# Patient Record
Sex: FEMALE | Race: WHITE | HISPANIC OR LATINO | Employment: UNEMPLOYED | URBAN - METROPOLITAN AREA
[De-identification: names, ages, dates, MRNs, and addresses within clinical notes are randomized per-mention and may not be internally consistent; named-entity substitution may affect disease eponyms.]

---

## 2021-10-21 PROBLEM — Z00.00 ANNUAL PHYSICAL EXAM: Status: ACTIVE | Noted: 2021-10-21

## 2021-10-25 ENCOUNTER — OFFICE VISIT (OUTPATIENT)
Dept: FAMILY MEDICINE CLINIC | Facility: CLINIC | Age: 31
End: 2021-10-25

## 2021-10-25 VITALS
HEART RATE: 88 BPM | BODY MASS INDEX: 29.37 KG/M2 | SYSTOLIC BLOOD PRESSURE: 100 MMHG | DIASTOLIC BLOOD PRESSURE: 70 MMHG | WEIGHT: 172 LBS | HEIGHT: 64 IN | TEMPERATURE: 98.7 F | OXYGEN SATURATION: 98 % | RESPIRATION RATE: 16 BRPM

## 2021-10-25 DIAGNOSIS — Z23 IMMUNIZATION DUE: ICD-10-CM

## 2021-10-25 DIAGNOSIS — Z11.59 NEED FOR HEPATITIS C SCREENING TEST: ICD-10-CM

## 2021-10-25 DIAGNOSIS — T48.5X5A RHINITIS MEDICAMENTOSA: ICD-10-CM

## 2021-10-25 DIAGNOSIS — J31.0 CHRONIC RHINITIS: ICD-10-CM

## 2021-10-25 DIAGNOSIS — Z00.00 ANNUAL PHYSICAL EXAM: Primary | ICD-10-CM

## 2021-10-25 DIAGNOSIS — J31.0 RHINITIS MEDICAMENTOSA: ICD-10-CM

## 2021-10-25 PROBLEM — L40.9 PSORIASIS: Status: ACTIVE | Noted: 2021-10-25

## 2021-10-25 PROCEDURE — 99385 PREV VISIT NEW AGE 18-39: CPT | Performed by: FAMILY MEDICINE

## 2021-10-25 PROCEDURE — 90686 IIV4 VACC NO PRSV 0.5 ML IM: CPT | Performed by: FAMILY MEDICINE

## 2021-10-25 PROCEDURE — 90471 IMMUNIZATION ADMIN: CPT | Performed by: FAMILY MEDICINE

## 2021-10-25 RX ORDER — PREDNISONE 10 MG/1
TABLET ORAL
Qty: 40 TABLET | Refills: 0 | Status: SHIPPED | OUTPATIENT
Start: 2021-10-25 | End: 2021-12-29

## 2021-10-25 RX ORDER — METHYLPREDNISOLONE 4 MG/1
TABLET ORAL
Qty: 21 EACH | Refills: 0 | Status: SHIPPED | OUTPATIENT
Start: 2021-10-25 | End: 2021-10-25 | Stop reason: CLARIF

## 2021-10-26 ENCOUNTER — APPOINTMENT (OUTPATIENT)
Dept: LAB | Facility: CLINIC | Age: 31
End: 2021-10-26

## 2021-10-26 DIAGNOSIS — Z11.59 NEED FOR HEPATITIS C SCREENING TEST: ICD-10-CM

## 2021-10-26 DIAGNOSIS — Z00.00 ANNUAL PHYSICAL EXAM: ICD-10-CM

## 2021-10-26 LAB
ALBUMIN SERPL BCP-MCNC: 3.9 G/DL (ref 3.5–5)
ALP SERPL-CCNC: 81 U/L (ref 46–116)
ALT SERPL W P-5'-P-CCNC: 83 U/L (ref 12–78)
ANION GAP SERPL CALCULATED.3IONS-SCNC: 4 MMOL/L (ref 4–13)
AST SERPL W P-5'-P-CCNC: 23 U/L (ref 5–45)
BASOPHILS # BLD AUTO: 0.05 THOUSANDS/ΜL (ref 0–0.1)
BASOPHILS NFR BLD AUTO: 0 % (ref 0–1)
BILIRUB SERPL-MCNC: 0.42 MG/DL (ref 0.2–1)
BUN SERPL-MCNC: 11 MG/DL (ref 5–25)
CALCIUM SERPL-MCNC: 9.5 MG/DL (ref 8.3–10.1)
CHLORIDE SERPL-SCNC: 107 MMOL/L (ref 100–108)
CHOLEST SERPL-MCNC: 269 MG/DL (ref 50–200)
CO2 SERPL-SCNC: 24 MMOL/L (ref 21–32)
CREAT SERPL-MCNC: 0.67 MG/DL (ref 0.6–1.3)
EOSINOPHIL # BLD AUTO: 0.15 THOUSAND/ΜL (ref 0–0.61)
EOSINOPHIL NFR BLD AUTO: 1 % (ref 0–6)
ERYTHROCYTE [DISTWIDTH] IN BLOOD BY AUTOMATED COUNT: 12.4 % (ref 11.6–15.1)
GFR SERPL CREATININE-BSD FRML MDRD: 119 ML/MIN/1.73SQ M
GLUCOSE P FAST SERPL-MCNC: 92 MG/DL (ref 65–99)
HCT VFR BLD AUTO: 39.3 % (ref 34.8–46.1)
HCV AB SER QL: NORMAL
HDLC SERPL-MCNC: 43 MG/DL
HGB BLD-MCNC: 12.6 G/DL (ref 11.5–15.4)
IMM GRANULOCYTES # BLD AUTO: 0.05 THOUSAND/UL (ref 0–0.2)
IMM GRANULOCYTES NFR BLD AUTO: 0 % (ref 0–2)
LDLC SERPL CALC-MCNC: 198 MG/DL (ref 0–100)
LYMPHOCYTES # BLD AUTO: 3.86 THOUSANDS/ΜL (ref 0.6–4.47)
LYMPHOCYTES NFR BLD AUTO: 31 % (ref 14–44)
MCH RBC QN AUTO: 29.4 PG (ref 26.8–34.3)
MCHC RBC AUTO-ENTMCNC: 32.1 G/DL (ref 31.4–37.4)
MCV RBC AUTO: 92 FL (ref 82–98)
MONOCYTES # BLD AUTO: 0.83 THOUSAND/ΜL (ref 0.17–1.22)
MONOCYTES NFR BLD AUTO: 7 % (ref 4–12)
NEUTROPHILS # BLD AUTO: 7.48 THOUSANDS/ΜL (ref 1.85–7.62)
NEUTS SEG NFR BLD AUTO: 61 % (ref 43–75)
NONHDLC SERPL-MCNC: 226 MG/DL
NRBC BLD AUTO-RTO: 0 /100 WBCS
PLATELET # BLD AUTO: 349 THOUSANDS/UL (ref 149–390)
PMV BLD AUTO: 11 FL (ref 8.9–12.7)
POTASSIUM SERPL-SCNC: 3.7 MMOL/L (ref 3.5–5.3)
PROT SERPL-MCNC: 8 G/DL (ref 6.4–8.2)
RBC # BLD AUTO: 4.29 MILLION/UL (ref 3.81–5.12)
SODIUM SERPL-SCNC: 135 MMOL/L (ref 136–145)
TRIGL SERPL-MCNC: 138 MG/DL
WBC # BLD AUTO: 12.42 THOUSAND/UL (ref 4.31–10.16)

## 2021-10-26 PROCEDURE — 85025 COMPLETE CBC W/AUTO DIFF WBC: CPT

## 2021-10-26 PROCEDURE — 86803 HEPATITIS C AB TEST: CPT

## 2021-10-26 PROCEDURE — 36415 COLL VENOUS BLD VENIPUNCTURE: CPT | Performed by: FAMILY MEDICINE

## 2021-10-26 PROCEDURE — 80053 COMPREHEN METABOLIC PANEL: CPT | Performed by: FAMILY MEDICINE

## 2021-10-26 PROCEDURE — 80061 LIPID PANEL: CPT | Performed by: FAMILY MEDICINE

## 2021-11-02 DIAGNOSIS — R79.9 ABNORMAL BLOOD CHEMISTRY: Primary | ICD-10-CM

## 2021-12-28 ENCOUNTER — OFFICE VISIT (OUTPATIENT)
Dept: FAMILY MEDICINE CLINIC | Facility: CLINIC | Age: 31
End: 2021-12-28

## 2021-12-28 VITALS
TEMPERATURE: 97.4 F | RESPIRATION RATE: 18 BRPM | HEART RATE: 96 BPM | HEIGHT: 64 IN | BODY MASS INDEX: 28.68 KG/M2 | DIASTOLIC BLOOD PRESSURE: 68 MMHG | SYSTOLIC BLOOD PRESSURE: 100 MMHG | OXYGEN SATURATION: 98 % | WEIGHT: 168 LBS

## 2021-12-28 DIAGNOSIS — Z32.01 POSITIVE PREGNANCY TEST: ICD-10-CM

## 2021-12-28 DIAGNOSIS — N91.2 AMENORRHEA: Primary | ICD-10-CM

## 2021-12-28 LAB — SL AMB POCT URINE HCG: POSITIVE

## 2021-12-28 PROCEDURE — 81025 URINE PREGNANCY TEST: CPT | Performed by: FAMILY MEDICINE

## 2021-12-28 PROCEDURE — 99214 OFFICE O/P EST MOD 30 MIN: CPT | Performed by: FAMILY MEDICINE

## 2022-01-06 ENCOUNTER — ULTRASOUND (OUTPATIENT)
Dept: OBGYN CLINIC | Facility: CLINIC | Age: 32
End: 2022-01-06

## 2022-01-06 VITALS
DIASTOLIC BLOOD PRESSURE: 70 MMHG | HEIGHT: 64 IN | WEIGHT: 167 LBS | HEART RATE: 106 BPM | SYSTOLIC BLOOD PRESSURE: 108 MMHG | BODY MASS INDEX: 28.51 KG/M2

## 2022-01-06 DIAGNOSIS — Z3A.17 17 WEEKS GESTATION OF PREGNANCY: Primary | Chronic | ICD-10-CM

## 2022-01-06 PROCEDURE — 99213 OFFICE O/P EST LOW 20 MIN: CPT | Performed by: OBSTETRICS & GYNECOLOGY

## 2022-01-06 NOTE — PROGRESS NOTES
Assessment  36 y o   at 17w3d presenting for amenorrhea  Pregnancy confirmed, dating inconsistent with LMP  DEMETRIUS 2022  Plan  Problem List Items Addressed This Visit        Other    17 weeks gestation of pregnancy - Primary (Chronic)        - Prenatal vitamin  - Prenatal panel (slips given today)  - Discussed genetic screening  - Prenatal Nursing Intake in 1 weeks  - Prenatal H&P in 2 weeks     ____________________________________________________________      Subjective   Kerri Perez is a 36 y o  Lisaalee Vargas with an LMP of Patient's last menstrual period was 2020 (lmp unknown)  (Not calculated  by LMP) who presents for amenorrhea  She notes she took a home pregnancy test on 12/15/2021 and it was positive  She is currently otherwise without complaint  Patient notes that this pregnancy was unplanned  She was not using contraception at the time  She reports she is uncertain of her LMP and that she has regular menses  She has has no vaginal bleeding since her LMP  Patient's prior pregnancies were uncomplicated term vaginal delivery        Objective  /70 (BP Location: Left arm, Patient Position: Sitting, Cuff Size: Adult)   Pulse (!) 106   Ht 5' 4" (1 626 m)   Wt 75 8 kg (167 lb)   LMP 2020 (LMP Unknown)   BMI 28 67 kg/m²       Physical Exam:  Physical Exam        Transvaginal Pelvic Ultrasound  Spears IUP  BPD: 37 4 mm 17w3d  HC: 137 5 mm 17w1d   AC: 118 mm  17w3d  FL: 25 5 mm 17w 3d   Placental fundal anterior  MAGED, LVP: 4 28   bpm  Viable intrauterine pregnancy, US compatible with 17w3d, LMP unknown    No adnexal masses appreciated    Luann Redd MD  OBGYN, PGY-4  2022  10:34 AM

## 2022-01-11 ENCOUNTER — INITIAL PRENATAL (OUTPATIENT)
Dept: OBGYN CLINIC | Facility: CLINIC | Age: 32
End: 2022-01-11

## 2022-01-11 ENCOUNTER — LAB (OUTPATIENT)
Dept: LAB | Facility: CLINIC | Age: 32
End: 2022-01-11

## 2022-01-11 VITALS — RESPIRATION RATE: 16 BRPM | HEIGHT: 64 IN | BODY MASS INDEX: 28.27 KG/M2 | WEIGHT: 165.6 LBS

## 2022-01-11 DIAGNOSIS — O09.32 LATE PRENATAL CARE AFFECTING PREGNANCY IN SECOND TRIMESTER: Primary | ICD-10-CM

## 2022-01-11 DIAGNOSIS — R79.9 ABNORMAL BLOOD CHEMISTRY: ICD-10-CM

## 2022-01-11 LAB
ALT SERPL W P-5'-P-CCNC: 55 U/L (ref 12–78)
BASOPHILS # BLD AUTO: 0.03 THOUSANDS/ΜL (ref 0–0.1)
BASOPHILS NFR BLD AUTO: 0 % (ref 0–1)
CHOLEST SERPL-MCNC: 247 MG/DL
EOSINOPHIL # BLD AUTO: 0.1 THOUSAND/ΜL (ref 0–0.61)
EOSINOPHIL NFR BLD AUTO: 1 % (ref 0–6)
ERYTHROCYTE [DISTWIDTH] IN BLOOD BY AUTOMATED COUNT: 12.1 % (ref 11.6–15.1)
HCT VFR BLD AUTO: 39.2 % (ref 34.8–46.1)
HDLC SERPL-MCNC: 46 MG/DL
HGB BLD-MCNC: 12.3 G/DL (ref 11.5–15.4)
IMM GRANULOCYTES # BLD AUTO: 0.03 THOUSAND/UL (ref 0–0.2)
IMM GRANULOCYTES NFR BLD AUTO: 0 % (ref 0–2)
LDLC SERPL CALC-MCNC: 157 MG/DL (ref 0–100)
LYMPHOCYTES # BLD AUTO: 2.31 THOUSANDS/ΜL (ref 0.6–4.47)
LYMPHOCYTES NFR BLD AUTO: 27 % (ref 14–44)
MCH RBC QN AUTO: 28.3 PG (ref 26.8–34.3)
MCHC RBC AUTO-ENTMCNC: 31.4 G/DL (ref 31.4–37.4)
MCV RBC AUTO: 90 FL (ref 82–98)
MONOCYTES # BLD AUTO: 0.63 THOUSAND/ΜL (ref 0.17–1.22)
MONOCYTES NFR BLD AUTO: 7 % (ref 4–12)
NEUTROPHILS # BLD AUTO: 5.39 THOUSANDS/ΜL (ref 1.85–7.62)
NEUTS SEG NFR BLD AUTO: 65 % (ref 43–75)
NONHDLC SERPL-MCNC: 201 MG/DL
NRBC BLD AUTO-RTO: 0 /100 WBCS
PLATELET # BLD AUTO: 310 THOUSANDS/UL (ref 149–390)
PMV BLD AUTO: 11.7 FL (ref 8.9–12.7)
RBC # BLD AUTO: 4.34 MILLION/UL (ref 3.81–5.12)
TRIGL SERPL-MCNC: 219 MG/DL
WBC # BLD AUTO: 8.49 THOUSAND/UL (ref 4.31–10.16)

## 2022-01-11 PROCEDURE — 84460 ALANINE AMINO (ALT) (SGPT): CPT

## 2022-01-11 PROCEDURE — 36415 COLL VENOUS BLD VENIPUNCTURE: CPT

## 2022-01-11 PROCEDURE — 85025 COMPLETE CBC W/AUTO DIFF WBC: CPT

## 2022-01-11 PROCEDURE — 80061 LIPID PANEL: CPT

## 2022-01-11 NOTE — LETTER
Proof of Pregnancy Letter    Franky Monet  1990  [address]  [address]  [address]        01/11/22      Jamilah Bonilla is a patient at our facility  Franky Monet Estimated Date of Delivery: 6/13/22       Any questions or concerns, please feel free to contact our office      Sincerely,     [facility]   [address],   [place], [address] [place] [address]   [phone]

## 2022-01-11 NOTE — PROGRESS NOTES
OB INTAKE INTERVIEW  Pt presents for OB intake    OB History    Para Term  AB Living   2 1 1     1   SAB IAB Ectopic Multiple Live Births           1      # Outcome Date GA Lbr Craig/2nd Weight Sex Delivery Anes PTL Lv   2 Current            1 Term              Hx of  delivery prior to 36 weeks 6 days:  No   If yes, place a referral for cervical surveillance at 16 weeks  Last Menstrual Period:    Patient's last menstrual period was 2020 (lmp unknown)  Ultrasound date: 2022  17 weeks 0 days     Estimated Date of Delivery: 22   Confirmed by  US    H&P visit scheduled  2022      Last pap smear: unknown date    Findings; lab pap smear results: patient does not know    Current Issues:  Constipation :   No  Headaches :   Yes  Cramping:  No  Spotting :   No  PICA cravings :  No    FOB Involved:   Yes  Planned pregnancy:  No    I have these concerns about this prenatal patient: intake done with  language line  Pregnancy is unplanned  Patient did not know until 16 weeks due to irregular periods  States she was on a lot of diet pills (does not know name) prior to discovering she was pregnant  C/O headaches, diarrhea, loss of appetite and nausea since last week  I explained that this may not be related to pregnancy  Discussed with attending  Patient is going to try to complete home covid test-- will call with results  She is fully vaccinated  Patient will also call if headaches worsen and are unrelieved by tylenol  Level 2 US scheduled for 22 at [address]  [facility] Pregnancy Essentials Book reviewed and discussed    Baby and Me Handout   Baby and [address] Handout   [facility] Handout   Discussed genetic testing   Prenatal lab work: Scripts printed and given to pt   Influenza vaccine given today: No   Discussed Tdap vaccine     Immunizations:   Immunization History   Administered Date(s) Administered  COVID-19 PFIZER VACCINE 0 3 ML IM 07/19/2021, 08/22/2021    Influenza, injectable, quadrivalent, preservative free 0 5 mL 10/25/2021     Depression Screening Follow-up Plan: Patient's depression screening was negative with an [place] score of  8  Clinically patient does not have depression  No treatment is required             Infection Screening: Does the pt have a hx of MRSA? No  If yes- please follow MRSA protocol and obtain a nasal swab for MRSA culture    The patient was oriented to our practice and all questions were answered    Interviewed by: Jignesh Kennedy RN 01/11/22

## 2022-01-11 NOTE — LETTER
Work Letter    Beck Velazco Jhonny Tillmana  1990  [place]  [address] [id number]    Dear Rohan Jackson,      01/11/22        Your employee is a patient at [facility]  We recommend that all pregnant women:    1  Have a well-ventilated workspace  2  Wear low-heeled shoes  3  Work no more than 40 hours per week  4  Have a 15 minute break every 2 hours and at least 30 minutes for a meal break  5  Use good body mechanics by bending at your knees to avoid back strain and lift no more than 20 pounds without assistance  Will need assistance with lifting over 20 lbs  6  Have ready access to bathrooms and water  She may continue to work until her due date unless medical complications arise  We anticipate she may return to work in 6-8 weeks after delivery       Sincerely,    [facility]

## 2022-01-11 NOTE — LETTER
Dentist Letter    Stephen Schultz  1990  [facility]  [address]  [address] [facility] [address]          01/11/22    We have had several requests from local dentist requesting permission to perform procedures on our patients who are pregnant  We wish to respond with this letter regarding some of the more routine procedures that we have been asked about  The following procedures may be performed on our obstetric patients:   1  Administration of local anesthesia   2  Administration of antibiotics such as PCN, Ampicillin, and Erythromycin  3  Administration of pain medications such as Tylenol, Tylenol with codeine, and if needed Percocet  4  Shielded X-rays    Should you have any questions, please do not hesitate to contact at [phone]          Sincerely,    [facility]  [phone]

## 2022-01-11 NOTE — LETTER
[facility] Letter    Giulia Ovalle  1990  [address]  [address] [place]       01/11/22          Dani Walker is a patient and under our care in our office  Orlando Esparza's Estimated Date of Delivery: 6/13/22  Any questions or concerns feel free to contact our office       Thank you,    [address] [facility],[address]  [id number] [place] [address]/Jill Sam 15  [address] [facility]/Shae Walters [address] [place] [address]/[address]  [phone]

## 2022-01-12 ENCOUNTER — PATIENT OUTREACH (OUTPATIENT)
Dept: OBGYN CLINIC | Facility: CLINIC | Age: 32
End: 2022-01-12

## 2022-01-18 ENCOUNTER — PATIENT OUTREACH (OUTPATIENT)
Dept: OBGYN CLINIC | Facility: CLINIC | Age: 32
End: 2022-01-18

## 2022-01-18 NOTE — PROGRESS NOTES
JENNIFER HOLT spoke with 35 y/o-S- G2:P1-  Upper sorbian speaking woman for pre sumanth assessment  Pt resides with RUBEN and their 3 y/o son  Pt reported pregnancy was not intended but welcome  Pt states she did not seek pre  care sooner because she always has had irregular menses  Pt is a  and RUBEN works full time  Pt does expressed some financial concerns  Pt denies any usage of drug, alcohol or smoking  Denies any Mental Health or Domestic Violence History  Pt uses [facility] to keep her appointments  Pt claimed RUBEN and his sister are supportive  Pt's extended family remains in [facility]  Pt recently relocated from [place] due to RUBEN's work  Pt is trying to know her community  JENNIFER HOLT provided information regarding local [employer] and [employer]  Pt competed 3 1/2 years of college  Pt main concern is the hospital bill  Pt already met with the financial counselor to apply for the assistance and was encouraged to call Teetee Dickson for follow up  Pt has MA for her son and WIC  Need to call [address] to make an appointment for her  Pt denies other concern at this time  JENNIFER HOLT explained her role and gave contact information  Pt was encouraged to call at any time needed

## 2022-01-19 ENCOUNTER — HOSPITAL ENCOUNTER (OUTPATIENT)
Facility: HOSPITAL | Age: 32
Discharge: HOME | End: 2022-01-19
Attending: OBSTETRICS & GYNECOLOGY | Admitting: OBSTETRICS & GYNECOLOGY

## 2022-01-19 ENCOUNTER — HOSPITAL ENCOUNTER (OUTPATIENT)
Facility: HOSPITAL | Age: 32
End: 2022-01-19
Attending: OBSTETRICS & GYNECOLOGY | Admitting: OBSTETRICS & GYNECOLOGY

## 2022-01-19 VITALS
DIASTOLIC BLOOD PRESSURE: 64 MMHG | SYSTOLIC BLOOD PRESSURE: 105 MMHG | TEMPERATURE: 98.6 F | RESPIRATION RATE: 16 BRPM | HEART RATE: 92 BPM

## 2022-01-19 PROBLEM — Z3A.19 19 WEEKS GESTATION OF PREGNANCY: Status: ACTIVE | Noted: 2022-01-06

## 2022-01-19 PROBLEM — R51.9 HEADACHE: Status: ACTIVE | Noted: 2022-01-19

## 2022-01-19 PROCEDURE — 99214 OFFICE O/P EST MOD 30 MIN: CPT

## 2022-01-19 PROCEDURE — NC001 PR NO CHARGE: Performed by: OBSTETRICS & GYNECOLOGY

## 2022-01-19 RX ORDER — ACETAMINOPHEN 325 MG/1
650 TABLET ORAL EVERY 6 HOURS PRN
Status: DISCONTINUED | OUTPATIENT
Start: 2022-01-19 | End: 2022-01-19 | Stop reason: HOSPADM

## 2022-01-19 RX ORDER — METOCLOPRAMIDE HYDROCHLORIDE 5 MG/ML
10 INJECTION INTRAMUSCULAR; INTRAVENOUS EVERY 8 HOURS SCHEDULED
Status: DISCONTINUED | OUTPATIENT
Start: 2022-01-19 | End: 2022-01-19 | Stop reason: HOSPADM

## 2022-01-19 RX ORDER — MAGNESIUM SULFATE HEPTAHYDRATE 40 MG/ML
2 INJECTION, SOLUTION INTRAVENOUS
Status: DISCONTINUED | OUTPATIENT
Start: 2022-01-19 | End: 2022-01-19 | Stop reason: HOSPADM

## 2022-01-19 RX ADMIN — METOCLOPRAMIDE 10 MG: 5 INJECTION, SOLUTION INTRAMUSCULAR; INTRAVENOUS at 13:21

## 2022-01-19 RX ADMIN — SODIUM CHLORIDE 1000 ML: 0.9 INJECTION, SOLUTION INTRAVENOUS at 13:21

## 2022-01-19 RX ADMIN — MAGNESIUM SULFATE HEPTAHYDRATE 2 G: 40 INJECTION, SOLUTION INTRAVENOUS at 13:25

## 2022-01-19 RX ADMIN — SODIUM CHLORIDE 500 MG: 0.9 INJECTION, SOLUTION INTRAVENOUS at 14:21

## 2022-01-19 NOTE — PROGRESS NOTES
L&D Triage Note - OB/GYN  Emily Paredes 36 y o  female MRN: [id number]  Unit/Bed#:  TRIAGE  Encounter: [id number]      ASSESSMENT:    Emily Paredes is a 36 y o  Keira Packer at 19w2d who is presenting with a headache that has been gradually worsening over the last 2 days  This headache is more severe than her typical headaches, but otherwise she is denying red flag symptoms  Additionally she has no OB complaints and vital signs are WNL  PLAN:    1) Migraine Treatment   - Insert IV   - NS bolus IVF   - Reglan 10mg IV   - Tylenol PO   - Magnesium 2g IV   - Solumedrol 500mg IV     2)  Reassessment following Migraine [place]: Patient is significantly improved  Strict return precautions given for red flag symptoms of headache in pregnancy  Doses of Tylenol reviewed  Patient instructed to avoid other anti-headache medications  Migraine prophylaxis lifestyle modifications discussed  3) Continue routine prenatal care  4) Discharge from [facility] triage with  labor precautions    - Reviewed rupture of membranes, false vs true labor, decreased fetal movement, and vaginal bleeding   - Pt to call provider with any concerns and follow up at her next scheduled prenatal appointment   - Case discussed with Dr Constantino Otero:    Emily Paredes 36 y o  Keira Packer at 19w2d with an Estimated Date of Delivery: 22  I collected this history with the assistance of a phone  since patient is Bangladeshi speaking only  She is presenting this afternoon with a headache that has been gradually worsening over the past 2 days  She states that the headache is located in the bifrontal region without radiation  She is denying any trauma, fevers, photophobia, neck stiffness, neurologic symptoms including changes in vision, numbness, tingling, or weakness  She states that the headahce came on gradually and was not maximal at onset    Additionally, she says that this headache is similar to in quality to her typical headaches that she usually experiences but is more severe than her typical headaches and is different also because it was not alleviated by 4 doses of tylenol that she took yesterday  She has not tried anything else to alleviate the headache yet  Obstetrical history and complaints: She is denying any abdominal pain, contractions, vaginal bleeding, loss of fluid or cramping  She states that her past pregnancy was a normal vaginal birth that occurred at term without complication  This pregnancy has no known complications to date, however patient does note that she was late to prenatal care because she did not know she was pregnant until a few weeks ago  Up until that time, the patient had not been taking prenatal vitamins and was taking a "weight loss pill" that she can't remember the name of  She has since begun taking her prenatal vitamins, stopped taking the unknown medication, and has been seen for outpatient care at [facility] in [facility]           Contractions: denies  Leakage of fluid: denies  Vaginal Bleeding: denies  Fetal movement: states that she only just began to occasionally feel movement this week    OBJECTIVE:    Vitals:    01/19/22 1225   BP: 105/64   Pulse: 92   Resp: 16   Temp: 98 6 °F (37 °C)       ROS:  Constitutional: Negative  Respiratory: Negative  Cardiovascular: Negative    Gastrointestinal: Negative    General Physical Exam:  General: well developed and well nourished, non-toxic, alert, oriented times 3 and normal vitals  Cardiovascular: Cor RRR and No murmurs  Lungs: non-labored breathing, CTABL, No Wheezes, Rales or Rhonchi  Abdomen: abdomen is soft without significant tenderness, masses, organomegaly or guarding   Lower extremeties: nontender  Neuro: Sensation intact, no weakness in all muscle groups, CN II-XII intact, no facial droop, no dysarthria, normal FTN, normal HTS, Romberg negative, normal ambulation    Fetal monitoring:  FHT:  150 bpm via doppler      Felisa Palmer MD  Emergency Medicine- PGY1  1/19/2022 12:57 PM

## 2022-01-19 NOTE — DISCHARGE INSTRUCTIONS
El embarazo de la semana 19 a la 22   LO QUE NECESITA SABER:   Ahora que usted está en martínez mamie trimestre, tiene [place]  Es posible que también sienta más [place] de lo normal  Es posible que usted esté aumentando de ½ a 1 brady por [place], y que martínez embarazo se empiece a notar  Posiblemente usted necesite comenzar a usar ropa de maternidad  Conforme martínez bebé está creciendo, usted podría presentar otros síntomas  Estos podrían incluir dolor corporal o estrías en martínez abdomen, senos, muslos y glúteos  INSTRUCCIONES SOBRE EL [facility]AZIA HOSPITALARIA:   Busque atención médica de inmediato si:  · Usted presenta un guadalupe dolor de jorge que no desaparece  · Usted tiene cambios en la visión nuevos o en aumento, mayelin visión borrosa o con manchas  · Usted tiene inflamación nueva o creciente en martínez vita o michelle  · Usted tiene manchado o sangrado vaginal     · Usted rompe sai o siente un chorro o gotas de agua tibia que le está bajando por martínez vagina  Llame a martínez médico u obstetra si:  · Usted tiene calambres, presión o tensión abdominal     · Usted tiene un cambio en la secreción vaginal     · Usted no puede retener alimentos ni líquidos y está perdiendo [place]  · Usted tiene escalofríos o fiebre  · Usted tiene comezón, ardor o dolor vaginal     · Usted tiene cirstian secreción vaginal amarillenta, verdosa, janine o de [employer]  · Usted tiene dolor o ardor al Ellyn Pong, orina menos de lo habitual o tiene [place] rosada o sanguinolenta  · Usted tiene preguntas o inquietudes acerca de martínez condición o cuidado  Cómo cuidarse en esta etapa de martínez embarazo:      · Consuma alimentos saludables y variados  Alimentos saludables incluyen frutas, verduras, panes de shey integral, alimentos lácteos bajos en grasa, frijoles, connie magras y pescado  [place] líquidos mayelin se le haya indicado  Pregunte cuánto líquido debe tiffanie cada día y cuáles líquidos son los más adecuados para usted   Limite el consumo de cafeína a menos de 200 miligramos cada día  Limite el consumo de pescado a 2 porciones cada semana  Escoja pescado con concentraciones bajas de nancy mayelin atún al natural enlatado, camarón, salmón, bacalao o tilapia  No coma pescado con concentraciones altas de nancy mayelin pez chasidy, caballa gigante, pargo rayado y tiburón  · [id number] [address] [place]  Martínez necesidad de ciertas vitaminas y [facility], mayelin el ácido fólico, aumenta whitley el [facility]  Las vitaminas prenatales proporcionan algunas de las vitaminas y minerales adicionales que usted necesita  Las vitaminas prenatales también podrían ayudar a disminuir el riesgo de ciertos defectos de nacimiento  · Consulte con martínez médico acerca de hacer ejercicio  El ejercicio moderado puede ayudarla a mantenerse en forma  Martínez médico la ayudará a planear un programa de ejercicios que sea seguro para usted whitley martínez [facility]  · No fume  Fumar aumenta el riesgo de aborto espontáneo y otros problemas de su whitley martínez [facility]  Fumar puede causar que martínez bebé nazca antes de tiempo o que pese menos al nacer  Solicite información a martínez médico si usted necesita ayuda para dejar de fumar  · No consuma alcohol  El alcohol pasa de martínez cuerpo al bebé a través de la placenta  Puede afectar el desarrollo del cerebro de martínez bebé y provocar el síndrome de alcoholismo fetal (SAF)  El SAF es un lauren de condiciones que causan problemas [place], de comportamiento y de crecimiento  · Consulte con martínez médico antes de tiffanie cualquier medicamento  Muchos medicamentos pueden perjudicar a martínez bebé si usted los pilar [address] [place] [address]  No tome ningún medicamento, vitaminas, hierbas o suplementos sin kosta consultar con martínez Sunita Sella  use drogas ilegales o de la mckeon (mayelin marihuana o cocaína) mientras está embarazada  Consejos de seguridad whitley el embarazo:  · Evite jacuzzis y saunas   No use un jacuzzi o un sauna mientras usted está embarazada, especialmente whitley el primer trimestre  Los [employer] y los saunas aumentan la temperatura de salmon bebé y el riesgo de defectos de nacimiento  · Evite la toxoplasmosis  [place] es cristian infección causada por comer carne cruda o estar cerca del excremento de un lisa infectado  [place] puede causar malformaciones congénitas, aborto espontáneo y Tadeo Schein  Lávese las michelle después de tocar carne cruda  Asegúrese de que la carne esté amadeo cocida antes de comerla  Evite los huevos crudos y la Madalyn [place]  Use guantes o pida que alguien la ayude a limpiar la caja de arena del lisa mientras usted Alma Avila  Cambios que ocurren con salmon bebé: Para las 22 semanas, salmon bebé mide alrededor de 8 pulgadas de lizette desde la punta de la jorge hasta la rabadilla (parte inferior del bebé)  Salmon bebé también pesa alrededor de 1 brady  Salmon bebé se está volviendo [address]  Es posible que pueda sentir a salmon bebé moviéndose dentro de usted para [employer]  Podría ser que los primeros movimientos no cherelle tan notorios  Los movimientos podrían sentirse mayelin cristian sensación de revoloteo  Conforme pasa el tiempo, los movimientos de salmon bebé podrían volverse más lorenzo y notorios  Lo que necesita saber acerca del cuidado prenatal: Whitley las primeras 29 semanas de salmon embarazo, usted tendrá citas mensuales con salmon médico  Salmon médico le revisará salmon presión arterial y peso  Es posible que también necesite lo siguiente:  · Un examen de orina también podría realizarse para revisarle el azúcar y la proteína  Estas son señales de diabetes gestacional o de infección  La proteína en salmon orina también podría ser cristian señal de preeclampsia  La preeclampsia es cristian condición que puede desarrollarse whitley la semana 21 o después en salmon embarazo  Esta provoca presión arterial keshia y [employer] [employer] [place] con terrence riñones y [place]      · La altura uterina es cristian medición del útero para controlar el desarrollo de salmon bebé  Kacey Bolk por lo general es igual al número de [facility] que usted tiene de embarazo  · [place] ecografía del feto [place] imágenes del bebé dentro de martínez [place]  Muestra el desarrollo de martínez bebé  El movimiento y posición del bebé también se pueden observar  Es posible que martínez médico pueda decirle cuál es el sexo de martínez bebé whitley la ecografía  · El ritmo cardíaco de martínez bebé será revisado  Programe cristian mitul con martínez obstétrico mayelin se le indique: Anote terrence preguntas para que se acuerde de hacerlas whitley terrence visitas  © Copyright [employer] 2021 Information is for End User's use only and may not be sold, redistributed or otherwise used for commercial purposes  All illustrations and images included in CareNotes® are the copyrighted property of A D A "[employer] [facility]  or [address] [place] [address] es sólo para uso en educación  Martínez intención no es darle un consejo médico sobre enfermedades o tratamientos  Colsulte con martínez Lisa Phillips farmacéutico antes de seguir cualquier régimen médico para saber si es seguro y efectivo para usted  Migraña   CUIDADO AMBULATORIO:   Severa Ode es un dolor de jorge intenso  El dolor puede ser tan severo que interfiere con terrence actividades cotidianas  Severa Ode puede durar desde pocas horas hasta varios días  La causa exacta de la migraña no es conocida  Los antecedentes familiares de migrañas aumentan martínez riesgo  El [address]  es mayor si es marcelo o pilar medicamentos mayelin estrógenos o un vasodilatador    Los signos de advertencia comunes incluyen los siguientes: Los signos de advertencia generalmente comienzan de 15 a 60 minutos antes del dolor de jorge:  · Cambios visuales (auras), mayelin visión borrosa, ceguera temporal o manchas brillantes, líneas o alucinaciones    · Cansancio anormal o bostezos frecuentes    · Hormigueo en martínez brazo o pierna    Signos y síntomas de cristian migraña: La migraña comienza generalmente con un dolor monótono alrededor del ruben o la sien  El dolor podría empeorar con el movimiento  Usted también podría tener lo siguiente:  · Dolor en martínez jorge que podría aumentar hasta el punto que usted no es capaz de realizar kim actividades cotidianas    · Dolor en laura o ambos lados de martínez jorge    · Dolor punzante, pulsante o guadalupe en la jorge    · Náuseas y vómitos    · Sensibilidad a la jaylan, el ruido o los olores    Llame al número local de emergencias (911 en los [place]) o pídale a alguien que llame si:  · Usted siente que se va a desmayar, se siente confundido o sufre cristian convulsión  Busque atención médica de inmediato si:  · Usted tiene dolor de jorge que parece ser diferente o mucho peor que martínez migraña habitual     · Usted tiene un dolor de jorge severo con fiebre o rigidez en el jose alejandro  · Usted tiene nuevos problemas con el habla, la visión, el equilibrio o el [address]  Llame a martínez médico o neurólogo si:  · Tiene fiebre  · Martínez migraña interfiere con kim actividades cotidianas  · Kim medicamentos o tratamientos yifan de funcionar  · Tiene alguna pregunta acerca de martínez condición o cuidado  Tratamiento: Telly Lewis no tienen [place]  La meta del tratamiento es reducir kim síntomas  [place] el medicamento tan pronto mayelin sienta que le comienza [place], o según le hayan indicado  Se puede usar lo siguiente para controlar las migrañas:  · Los medicamentos podrían administrarse para evitar o tratar las migrañas  [place] el medicamento para evitar las migrañas tan pronto mayelin sienta que le comienza Haji, o según le hayan indicado  Martínez médico podría recomendarle cualquiera de los siguientes:    ? Medicamentos para la migraña se utilizan para ayudar a evitar o detener cristian Curlene Pack  ? [place], pueden disminuir la inflamación y el dolor o la fiebre  Lizzy medicamento está disponible con o sin cristian receta médica  Los SOFIA pueden causar sangrado estomacal o problemas renales en ciertas personas   Si usted pilar un medicamento anticoagulante, siempre pregúntele a martínez médico si los SOFIA son seguros para usted  Siempre shweta la etiqueta de lizzy medicamento y [place] instrucciones  ? Acetaminofén [employer] dolor y baja la fiebre  Está disponible sin receta médica  Pregunte la cantidad y la frecuencia con que debe tomarlos  [address]  Shweta las etiquetas de todos los demás medicamentos que esté usando para saber si también contienen acetaminofén, o pregunte a martínez médico o farmacéutico  El acetaminofén puede causar daño en el hígado cuando no se pilar de forma correcta  No use más de 4 gramos (4000 miligramos) en total de acetaminofeno en un día  ? Puede administrarse podrían administrarse  Pregunte al médico cómo debe tiffanie lizzy medicamento de forma samaniego  Algunos medicamentos recetados para el dolor contienen acetaminofén  No tome otros medicamentos que contengan acetaminofén sin consultarlo con martínez médico  Demasiado acetaminofeno puede causar daño al hígado  Los medicamentos recetados para el dolor podrían causar estreñimiento  Pregunte a martínez médico mayelin prevenir o tratar estreñimiento  ? Los [employer] contra las náuseas pueden darse para calmar martínez estómago y ayudarle a prevenir los vómitos  Lizzy medicamento también puede aliviar el dolor  · La terapia cognitiva conductual (TCC) puede ayudarlo a aprender [place] de controlar y [place] [address]  Un terapeuta puede enseñarle a relajarse y a reducir el estrés  Puede aprender [place] de crear cristian nutrición saludable, actividad y hábitos de sueño para prevenir las migrañas  El terapeuta también puede ayudarlo a controlar afecciones que pueden afectar las migrañas, mayelin la ansiedad o la depresión      Los factores desencadenantes comunes de la migraña incluyen los siguientes:  · El estrés, fatiga de los ojos, dormir demasiado o no dormir lo suficiente    · Cambios hormonales en las mujeres debido a las píldoras anticonceptivas, embarazo, menopausia o [employer] menstruación    · Omitir comidas, pasar mucho tiempo sin comer o no tiffanie suficientes líquidos    · Ciertos alimentos o bebidas, mayelin el chocolate, queso akbar, alcohol o bebidas que contienen cafeína    · Alimentos que contienen gluten, nitratos, glutamato monosódico o endulzantes artificiales    · [employer], luces brillantes o luces parpadeantes, ruidos lorenzo, humo u olores lorenzo    · Calor, humedad o cambios en el clima    El manejo de terrence síntomas:  · Repose en cristian habitación oscura y [place] ayudará a disminuir el dolor  El dormir también podría ayudarlo a aliviar martínez dolor  · Aplique hielo para reducir el dolor  Use cristian compresa de hielo o ponga hielo triturado en cristian bolsa de plástico  Aleck Bone el paquete con hielo con [facility] toalla y colóquela en martínez jorge donde siente el dolor por 15 a 20 minutos cada hora  · Aplique calor para disminuir el dolor y los espasmos musculares  Utilice cristian toalla pequeña empapada con [place], cristian almohada térmica o tome un baño de juliana con agua tibia  Aplique la compresa caliente sobre el área por 20 a 30 minutos cada 2 horas  Usted puede alternar el calor y el hielo  · Mantenga un registro de las migrañas  Escriba cuándo comienzan y terminan terrence migrañas  Vasu Jonathan y [place] haciendo cuando comenzó Haji  Registre lo que comió y lo que tomó las 24 horas antes de que comenzara martínez migraña  Mantenga un registro de lo que hizo para tratar martínez migraña y si funcionó  Evite otra migraña:  · Evite el dolor de jorge por uso excesivo de medicamentos  [place] los analgésicos solamente según le hayan indicado  Un medicamento puede estar limitado a cristian cierta cantidad cada mes  El médico puede ayudarlo a crear un plan para que reciba cristian cantidad samaniego cada mes  · No fume  La nicotina y otros químicos en los cigarrillos y cigarros pueden desencadenar cristian Marlene Mutton y Jenifer Margaret provocar daño al pulmón   Pida información a martínez médico si usted actualmente fuma y necesita ayuda para dejar de fumar  Los cigarrillos electrónicos o el tabaco sin humo igualmente contienen nicotina  Consulte con martínez médico antes de [employer]  · No consuma alcohol  El alcohol puede provocar migraña  También es posible que interfiera con los medicamentos utilizados para tratar martínez migraña  · Sea físicamente activo  La actividad física, mayelin el ejercicio, puede ayudar a [employer]  Consulte con martínez médico acerca del mejor plan de actividad para usted  Trate de hacer al menos 30 minutos de actividad física la mayoría de [place]  · Controle el estrés  El estrés podría provocar migraña  Aprenda nuevas maneras de relajarse mayelin la respiración profunda  · Establezca un horario para dormir  Acuéstese y levántese a la misma hora cada día  · Consuma alimentos saludables y variados  Los alimentos [employer] frutas, verduras, panes integrales, productos lácteos bajos en grasa, frijoles, connie magras y pescados  No consuma alimentos o bebidas que puedan desencadenar terrence migrañas  · [place] más líquidos para evitar la deshidratación  Martínez médico le indicará cuánto líquido debería beber a diario y qué líquidos son los mejores para usted  Acuda a terrence consultas de control con martínez médico o neurólogo según le indicaron: Lleve martínez registro de migrañas con usted  Anote terrence preguntas para que se acuerde de hacerlas whitley terrence visitas  © Copyright [employer] 2021 Information is for End User's use only and may not be sold, redistributed or otherwise used for commercial purposes  All illustrations and images included in CareNotes® are the copyrighted property of A D A [employer]  or [address] [place] [address] es sólo para uso en educación  Martínez intención no es darle un consejo médico sobre enfermedades o tratamientos   Colsulte con martínez Tim Lesser farmacéutico antes de seguir cualquier régimen médico para saber si es seguro y [employer] para usted

## 2022-01-27 ENCOUNTER — ROUTINE PRENATAL (OUTPATIENT)
Dept: OBGYN CLINIC | Facility: CLINIC | Age: 32
End: 2022-01-27

## 2022-01-27 ENCOUNTER — PATIENT OUTREACH (OUTPATIENT)
Dept: OBGYN CLINIC | Facility: CLINIC | Age: 32
End: 2022-01-27

## 2022-01-27 VITALS
HEART RATE: 93 BPM | HEIGHT: 64 IN | BODY MASS INDEX: 28.51 KG/M2 | DIASTOLIC BLOOD PRESSURE: 65 MMHG | SYSTOLIC BLOOD PRESSURE: 100 MMHG | WEIGHT: 167 LBS

## 2022-01-27 DIAGNOSIS — B37.3 CANDIDIASIS OF VAGINA DURING PREGNANCY: ICD-10-CM

## 2022-01-27 DIAGNOSIS — R21 MACULOPAPULAR RASH: ICD-10-CM

## 2022-01-27 DIAGNOSIS — O09.32 LATE PRENATAL CARE AFFECTING PREGNANCY IN SECOND TRIMESTER: Primary | ICD-10-CM

## 2022-01-27 DIAGNOSIS — R51.9 HEADACHE IN PREGNANCY, ANTEPARTUM, SECOND TRIMESTER: ICD-10-CM

## 2022-01-27 DIAGNOSIS — O22.42 HEMORRHOIDS DURING PREGNANCY IN SECOND TRIMESTER: ICD-10-CM

## 2022-01-27 DIAGNOSIS — O98.819 CANDIDIASIS OF VAGINA DURING PREGNANCY: ICD-10-CM

## 2022-01-27 DIAGNOSIS — Z3A.20 20 WEEKS GESTATION OF PREGNANCY: ICD-10-CM

## 2022-01-27 DIAGNOSIS — O26.892 HEADACHE IN PREGNANCY, ANTEPARTUM, SECOND TRIMESTER: ICD-10-CM

## 2022-01-27 PROCEDURE — 99213 OFFICE O/P EST LOW 20 MIN: CPT | Performed by: OBSTETRICS & GYNECOLOGY

## 2022-01-27 PROCEDURE — G0124 SCREEN C/V THIN LAYER BY MD: HCPCS | Performed by: PATHOLOGY

## 2022-01-27 PROCEDURE — 87491 CHLMYD TRACH DNA AMP PROBE: CPT | Performed by: STUDENT IN AN ORGANIZED HEALTH CARE EDUCATION/TRAINING PROGRAM

## 2022-01-27 PROCEDURE — G0145 SCR C/V CYTO,THINLAYER,RESCR: HCPCS | Performed by: PATHOLOGY

## 2022-01-27 PROCEDURE — 87591 N.GONORRHOEAE DNA AMP PROB: CPT | Performed by: STUDENT IN AN ORGANIZED HEALTH CARE EDUCATION/TRAINING PROGRAM

## 2022-01-27 RX ORDER — DOCUSATE SODIUM 100 MG/1
100 CAPSULE, LIQUID FILLED ORAL 2 TIMES DAILY
Qty: 60 CAPSULE | Refills: 6 | Status: SHIPPED | OUTPATIENT
Start: 2022-01-27

## 2022-01-27 NOTE — PROGRESS NOTES
JENNIFER HOLT met with Pt and Provider to assist with interpretation during H& P visit  Pt was c/o a rash around both breast, trunk, hands and back of her neck  Itchiness subside with clotrimazole  Headeaches, Hemorrhoid and Vaginal discharge  Pt was examined and was referred to Dermatologist, will  stool softener, medication for yeast infection and headaches  Pt will RTc in 4 weeks

## 2022-01-27 NOTE — PROGRESS NOTES
OB/GYN  PRENATAL H&P VISIT  Arnoldo Donaldson  2022  10:19 AM  Dr Suresh Slaughter MD      SUBJECTIVE  Patient is a  at 20w3d here for initial prenatal H&P  This is a desired pregnancy  She is currently doing well  She is unemployed  She deneis hx of STD/STI, denies a hx of TB or close contacts with persons with TB  She has not had MRSA  She denies a family history of inheritable conditions such as physical or intellectual disabilities, birth defects, blood disorders, heart or neural tube defects  She no recent travel or travel planned in the near future  She denies use of nicotine or recreational drug use  She denies use of ETOH  She denies vaginal bleeding, cramping, leakage  Endorses yellowish white discharge causing her to change her underwear three times daily and vaginal itching  She was previously induced for late term and PROM  She also complains of hemorrhoids from her previous delivery and constipation and was prescribed a stool softener and counseled regarding healthy bowel habits  She also has a pruritic maculopapular rash over her breasts, trunk, hands, and nape of the neck that itches  She has been using clotrimazole nightly which helps with the pruritis and washes it off before handling the baby  [facility] for interpreteation    OB History    Para Term  AB Living   2 1 1 0 0 1   SAB IAB Ectopic Multiple Live Births   0 0 0 0 1      # Outcome Date GA Lbr Craig/2nd Weight Sex Delivery Anes PTL Lv   2 Current            1 Term 20 40w0d  3629 g (8 lb) M Vag-Spont EPI  MCKENZIE       Review of Systems   Constitutional: Negative  HENT: Negative  Eyes: Negative  Respiratory: Negative  Cardiovascular: Negative  Gastrointestinal: Positive for constipation  Endocrine: Negative  Genitourinary: Positive for vaginal discharge  Vaginal itching   Musculoskeletal: Negative  Skin: Positive for rash  itching   Neurological: Negative  Psychiatric/Behavioral: Negative  Past Medical History:   Diagnosis Date    Allergic     Varicella        Past Surgical History:   Procedure Laterality Date    NO PAST SURGERIES         Social History     Socioeconomic History    Marital status: /Civil Union     Spouse name: Not on file    Number of children: Not on file    Years of education: Not on file    Highest education level: Not on file   Occupational History    Not on file   Tobacco Use    Smoking status: Never Smoker    Smokeless tobacco: Never Used   Vaping Use    Vaping Use: Never used   Substance and Sexual Activity    Alcohol use: Never    Drug use: Never    Sexual activity: Yes     Partners: Male     Birth control/protection: None   Other Topics Concern    Not on file   Social History Narrative    Not on file     Social Determinants of Health     Financial Resource Strain: Low Risk     Difficulty of Paying Living Expenses: Not very hard   Food Insecurity: No Food Insecurity    Worried About Running Out of Food in the Last Year: Never true    Zeeshan of Food in the Last Year: Never true   Transportation Needs: Unmet Transportation Needs    Lack of Transportation (Medical): Yes    Lack of Transportation (Non-Medical): Yes   Physical Activity: Insufficiently Active    Days of Exercise per Week: 3 days    Minutes of Exercise per Session: 30 min   Stress: No Stress Concern Present    Feeling of Stress : Only a little   Social Connections:  Moderately Integrated    Frequency of Communication with Friends and Family: Once a week    Frequency of Social Gatherings with Friends and Family: Twice a week    Attends Denominational Services: More than 4 times per year    Active Member of [employer] or Organizations: No    Attends Club or Organization Meetings: More than 4 times per year    Marital Status: Never    Intimate Partner Violence: Not At Risk    Fear of Current or Ex-Partner: No  Emotionally Abused: No    Physically Abused: No    Sexually Abused: No   Housing Stability: Low Risk     Unable to Pay for Housing in the Last Year: No    Number of Places Lived in the Last Year: 1    Unstable Housing in the Last Year: No       OBJECTIVE  Vitals:    01/27/22 1000   BP: 100/65   Pulse: 93     Physical Exam  Constitutional:       General: She is not in acute distress  Appearance: She is well-developed  She is not diaphoretic  Genitourinary:      Vulva normal       Vaginal discharge present  Right Adnexa: not tender, not full, no mass present and not absent  Left Adnexa: not tender, not full, no mass present and not absent  Cervical discharge (mucus and copious yeast) and eversion (ectropion) present  No cervical motion tenderness, lesion or nabothian cyst       Uterus is enlarged  Uterus is not tender  Breasts: Breasts are symmetrical       Right: No inverted nipple, mass, nipple discharge, skin change or tenderness  Left: No inverted nipple, mass, nipple discharge, skin change or tenderness  HENT:      Head: Normocephalic and atraumatic  Eyes:      Conjunctiva/sclera: Conjunctivae normal       Pupils: Pupils are equal, round, and reactive to light  Neck:      Trachea: No tracheal deviation  Cardiovascular:      Rate and Rhythm: Normal rate and regular rhythm  Heart sounds: Normal heart sounds  No murmur heard  No friction rub  No gallop  Pulmonary:      Effort: Pulmonary effort is normal  No respiratory distress  Breath sounds: Normal breath sounds  No stridor  No wheezing or rales  Abdominal:      General: There is no distension  Palpations: Abdomen is soft  There is no mass  Tenderness: There is no abdominal tenderness  There is no guarding or rebound  Musculoskeletal:         General: No tenderness or deformity  Normal range of motion  Cervical back: Normal range of motion     Neurological:      Mental Status: She is alert and oriented to person, place, and time  Coordination: Coordination normal    Skin:     General: Skin is warm and dry  Coloration: Skin is not pale  Findings: Rash (maculopapular rash over areolas, underside of breasts, down her trunk , and nape of neck) present  No erythema  Wet mount: negative, copious debris  KOH: debris and pseudohyphae    ASSESSMENT AND PLAN    36 y o , , with /65 (BP Location: Left arm, Patient Position: Sitting, Cuff Size: Adult)   Pulse 93   Ht 5' 4" (1 626 m)   Wt 75 8 kg (167 lb)   LMP 2020 (LMP Unknown) , at 30w3d here for her prenatal H&P   by Doppler    Pregnancy: H&P completed today  PN Labs reviewed today  Labor expectations discussed with patient, including appointment schedule, nutrition, exercise, medications, sexual intercourse, and nausea/vomiting  Patient's BMI is 28  Recommended weight gain is no more than 20 lbs  KOH and wet mount performed due to vaginal complaints     Screening: Pap smear colelcted  GC/CT collected  Sequential screening reviewed with patient - will proceed with AFP, provided today  Consents: Delivery process including potential OVD and  reviewed  Sign delivery consent form at 28 weeks  Labor: Patient plans on vaginal delivery  Contraception: Different methods of contraception were discussed with patient, including progesterone only oral pills, depo provera, nexplanon, mirena, and paragard  Patient would like to use something but is unsure what during postpartum phase  Follow up: RTC in 4 weeks  Precautions regarding labor, leakage, bleeding, and fetal movement reviewed      Adrianna Biswas MD  2022  10:19 AM

## 2022-01-29 LAB
C TRACH DNA SPEC QL NAA+PROBE: NEGATIVE
N GONORRHOEA DNA SPEC QL NAA+PROBE: NEGATIVE

## 2022-01-31 ENCOUNTER — APPOINTMENT (OUTPATIENT)
Dept: LAB | Facility: CLINIC | Age: 32
End: 2022-01-31

## 2022-01-31 DIAGNOSIS — O09.32 LATE PRENATAL CARE AFFECTING PREGNANCY IN SECOND TRIMESTER: ICD-10-CM

## 2022-01-31 DIAGNOSIS — Z3A.20 20 WEEKS GESTATION OF PREGNANCY: ICD-10-CM

## 2022-01-31 LAB
ABO GROUP BLD: NORMAL
BACTERIA UR QL AUTO: ABNORMAL /HPF
BASOPHILS # BLD AUTO: 0.02 THOUSANDS/ΜL (ref 0–0.1)
BASOPHILS NFR BLD AUTO: 0 % (ref 0–1)
BILIRUB UR QL STRIP: NEGATIVE
BLD GP AB SCN SERPL QL: NEGATIVE
CLARITY UR: CLEAR
COLOR UR: YELLOW
EOSINOPHIL # BLD AUTO: 0.09 THOUSAND/ΜL (ref 0–0.61)
EOSINOPHIL NFR BLD AUTO: 1 % (ref 0–6)
ERYTHROCYTE [DISTWIDTH] IN BLOOD BY AUTOMATED COUNT: 13 % (ref 11.6–15.1)
GLUCOSE UR STRIP-MCNC: NEGATIVE MG/DL
HBV SURFACE AG SER QL: NORMAL
HCT VFR BLD AUTO: 38.4 % (ref 34.8–46.1)
HGB BLD-MCNC: 11.9 G/DL (ref 11.5–15.4)
HGB UR QL STRIP.AUTO: NEGATIVE
IMM GRANULOCYTES # BLD AUTO: 0.02 THOUSAND/UL (ref 0–0.2)
IMM GRANULOCYTES NFR BLD AUTO: 0 % (ref 0–2)
KETONES UR STRIP-MCNC: NEGATIVE MG/DL
LEUKOCYTE ESTERASE UR QL STRIP: ABNORMAL
LYMPHOCYTES # BLD AUTO: 2.76 THOUSANDS/ΜL (ref 0.6–4.47)
LYMPHOCYTES NFR BLD AUTO: 30 % (ref 14–44)
MCH RBC QN AUTO: 28.5 PG (ref 26.8–34.3)
MCHC RBC AUTO-ENTMCNC: 31 G/DL (ref 31.4–37.4)
MCV RBC AUTO: 92 FL (ref 82–98)
MONOCYTES # BLD AUTO: 0.57 THOUSAND/ΜL (ref 0.17–1.22)
MONOCYTES NFR BLD AUTO: 6 % (ref 4–12)
MUCOUS THREADS UR QL AUTO: ABNORMAL
NEUTROPHILS # BLD AUTO: 5.8 THOUSANDS/ΜL (ref 1.85–7.62)
NEUTS SEG NFR BLD AUTO: 63 % (ref 43–75)
NITRITE UR QL STRIP: NEGATIVE
NON-SQ EPI CELLS URNS QL MICRO: ABNORMAL /HPF
NRBC BLD AUTO-RTO: 0 /100 WBCS
OTHER STN SPEC: ABNORMAL
PH UR STRIP.AUTO: 6 [PH]
PLATELET # BLD AUTO: 304 THOUSANDS/UL (ref 149–390)
PMV BLD AUTO: 11.2 FL (ref 8.9–12.7)
PROT UR STRIP-MCNC: NEGATIVE MG/DL
RBC # BLD AUTO: 4.17 MILLION/UL (ref 3.81–5.12)
RBC #/AREA URNS AUTO: ABNORMAL /HPF
RH BLD: POSITIVE
RUBV IGG SERPL IA-ACNC: >175 IU/ML
SP GR UR STRIP.AUTO: 1.02 (ref 1–1.03)
SPECIMEN EXPIRATION DATE: NORMAL
UROBILINOGEN UR QL STRIP.AUTO: 0.2 E.U./DL
WBC # BLD AUTO: 9.26 THOUSAND/UL (ref 4.31–10.16)
WBC #/AREA URNS AUTO: ABNORMAL /HPF

## 2022-01-31 PROCEDURE — 81001 URINALYSIS AUTO W/SCOPE: CPT

## 2022-01-31 PROCEDURE — 82105 ALPHA-FETOPROTEIN SERUM: CPT

## 2022-01-31 PROCEDURE — 80081 OBSTETRIC PANEL INC HIV TSTG: CPT

## 2022-01-31 PROCEDURE — 87086 URINE CULTURE/COLONY COUNT: CPT

## 2022-01-31 PROCEDURE — 87147 CULTURE TYPE IMMUNOLOGIC: CPT

## 2022-01-31 PROCEDURE — 36415 COLL VENOUS BLD VENIPUNCTURE: CPT

## 2022-02-01 LAB
HIV 1+2 AB+HIV1 P24 AG SERPL QL IA: NORMAL
RPR SER QL: NORMAL

## 2022-02-02 ENCOUNTER — ROUTINE PRENATAL (OUTPATIENT)
Dept: PERINATAL CARE | Facility: OTHER | Age: 32
End: 2022-02-02

## 2022-02-02 VITALS
DIASTOLIC BLOOD PRESSURE: 64 MMHG | HEIGHT: 64 IN | WEIGHT: 165.57 LBS | BODY MASS INDEX: 28.27 KG/M2 | HEART RATE: 102 BPM | SYSTOLIC BLOOD PRESSURE: 100 MMHG

## 2022-02-02 DIAGNOSIS — R51.9 HEADACHE IN PREGNANCY, ANTEPARTUM, SECOND TRIMESTER: ICD-10-CM

## 2022-02-02 DIAGNOSIS — O26.892 HEADACHE IN PREGNANCY, ANTEPARTUM, SECOND TRIMESTER: ICD-10-CM

## 2022-02-02 DIAGNOSIS — J31.0 CHRONIC RHINITIS: Primary | ICD-10-CM

## 2022-02-02 DIAGNOSIS — Z3A.21 21 WEEKS GESTATION OF PREGNANCY: ICD-10-CM

## 2022-02-02 DIAGNOSIS — Z36.3 ENCOUNTER FOR ANTENATAL SCREENING FOR MALFORMATION: ICD-10-CM

## 2022-02-02 DIAGNOSIS — O09.32 LATE PRENATAL CARE AFFECTING PREGNANCY IN SECOND TRIMESTER: ICD-10-CM

## 2022-02-02 LAB
2ND TRIMESTER 4 SCREEN SERPL-IMP: NORMAL
AFP ADJ MOM SERPL: 0.87
AFP INTERP AMN-IMP: NORMAL
AFP INTERP SERPL-IMP: NORMAL
AFP INTERP SERPL-IMP: NORMAL
AFP SERPL-MCNC: 52.9 NG/ML
AGE AT DELIVERY: 29.7 YR
BACTERIA UR CULT: ABNORMAL
BACTERIA UR CULT: ABNORMAL
GA METHOD: NORMAL
GA: 21 WEEKS
IDDM PATIENT QL: NO
LAB AP GYN PRIMARY INTERPRETATION: NORMAL
Lab: NORMAL
MULTIPLE PREGNANCY: NO
NEURAL TUBE DEFECT RISK FETUS: NORMAL %
PATH INTERP SPEC-IMP: NORMAL

## 2022-02-02 PROCEDURE — 76805 OB US >/= 14 WKS SNGL FETUS: CPT | Performed by: OBSTETRICS & GYNECOLOGY

## 2022-02-02 RX ORDER — FLUTICASONE PROPIONATE 50 MCG
SPRAY, SUSPENSION (ML) NASAL
Qty: 16 G | Refills: 3 | Status: SHIPPED | OUTPATIENT
Start: 2022-02-02 | End: 2022-07-13

## 2022-02-02 NOTE — PROGRESS NOTES
OFFICE CONSULT  Alison Liao [address] [place] [address],  [facility] [address]     Dear Jorge A Shields     Thank you for requesting a  consultation on your patient Arnoldo Donaldson for the following indications:  Anatomical survey I used the language line to  this patient  [id number]  History  Past medical history:  None  Past surgical history: Tonsillectomy  Medications:  Prenatal vitamins  Allergies to medications:  None  Past obstetrical history:   2 para 1 with a history of 8 pound baby born at 43 weeks on 20  She had a male infant that delivered vaginally without complications in [address] history:  Denies substance use  First generation family history:  None    She has completed both the COVID vaccine x2 shots and the flu vaccine  She is due for her booster dose the end of February  She declined genetic screening and [facility]      Ultrasound findings: The ultrasound shows a fetus that is growing concordant with her dates  The amniotic fluid and cervical length appears normal   No malformations are detected but we were unable to complete the anatomical survey secondary to fetal position  The patient was informed of the findings and counseled about the limitations of the exam in detecting all forms of fetal congenital abnormalities  She does not report any vaginal bleeding or uterine cramping/contractions  She does feel fetal movement  Today she complains of a daily headache for which she takes Tylenol which can make it milder but does not make it go away  In early pregnancy she was taking Afrin for her allergies which she was told by her provider to stop taking and this seems to be making her headaches worse  Follow up recommended:   Recommend she complete her COVID booster at the end of February  Recommend a follow-up ultrasound at 32 weeks for growth and missed anatomy    I sent a prescription to her pharmacy for Flonase to see if this would help with her headache that seem to correlate was stopping her Afrin medication         Rashard Frazier MD

## 2022-02-02 NOTE — PROGRESS NOTES
Ultrasound Probe Disinfection    A transvaginal ultrasound was performed  Prior to use, disinfection was performed with High Level Disinfection Process (Trophon)  Probe serial number A2: [id number] was used        Candance Nova Radice  02/02/22  10:47 AM

## 2022-02-02 NOTE — LETTER
2022     Ja Valentin, [address] [id number]    Patient: Joel Jacobsen   YOB: 1990   Date of Visit: 2022       Dear Dr Mora Hamm: Thank you for referring Mendel Bridgeman to me for evaluation  Below are my notes for this consultation  If you have questions, please do not hesitate to call me  I look forward to following your patient along with you  Sincerely,        Fabiano Chowdhury MD        CC: No Recipients  Fabiano Chowdhury MD  2022 12:46 PM  Sign when Signing Visit  OFFICE CONSULT  Sonnie Danker, [place]  [address] [facility],  [facility] [address]     Dear Ja Valentin     Thank you for requesting a  consultation on your patient Joel Jacobsen for the following indications:  Anatomical survey I used the language line to  this patient  [id number]  History  Past medical history:  None  Past surgical history: Tonsillectomy  Medications:  Prenatal vitamins  Allergies to medications:  None  Past obstetrical history:   2 para 1 with a history of 8 pound baby born at 43 weeks on 20  She had a male infant that delivered vaginally without complications in [address] history:  Denies substance use  First generation family history:  None    She has completed both the COVID vaccine x2 shots and the flu vaccine  She is due for her booster dose the end of February  She declined genetic screening and is agreeable to the [facility] which was normal      Ultrasound findings: The ultrasound shows a fetus that is growing concordant with her dates  The amniotic fluid and cervical length appears normal   No malformations are detected but we were unable to complete the anatomical survey secondary to fetal position      The patient was informed of the findings and counseled about the limitations of the exam in detecting all forms of fetal congenital abnormalities  She does not report any vaginal bleeding or uterine cramping/contractions  She does feel fetal movement  Today she complains of a daily headache for which she takes Tylenol which can make it milder but does not make it go away  In early pregnancy she was taking Afrin for her allergies which she was told by her provider to stop taking and this seems to be making her headaches worse  Follow up recommended:   Recommend she complete her COVID booster at the end of February  Recommend a follow-up ultrasound at 32 weeks for growth and missed anatomy  I sent a prescription to her pharmacy for Flonase to see if this would help with her headache that seem to correlate was stopping her Afrin medication         Luis Fernando Macario MD

## 2022-02-07 PROBLEM — R82.71 GBS BACTERIURIA: Status: ACTIVE | Noted: 2022-02-07

## 2022-02-15 ENCOUNTER — OFFICE VISIT (OUTPATIENT)
Dept: DERMATOLOGY | Facility: CLINIC | Age: 32
End: 2022-02-15

## 2022-02-15 VITALS — BODY MASS INDEX: 28.73 KG/M2 | WEIGHT: 167.4 LBS | TEMPERATURE: 98.7 F

## 2022-02-15 DIAGNOSIS — L85.3 XEROSIS OF SKIN: ICD-10-CM

## 2022-02-15 DIAGNOSIS — L28.0 LICHENOID DERMATITIS: Primary | ICD-10-CM

## 2022-02-15 PROCEDURE — 99242 OFF/OP CONSLTJ NEW/EST SF 20: CPT | Performed by: DERMATOLOGY

## 2022-02-15 NOTE — PATIENT INSTRUCTIONS
Assessment and Plan:  Based on a thorough discussion of this condition and the management approach to it (including a comprehensive discussion of the known risks, side effects and potential benefits of treatment), the patient (family) agrees to implement the following specific plan:  Stop Cortizone; Parar de usar Cortizona   Advise gentle skin care as follows: Aconseje un cuidado suave de la piel de la siguiente manera:   Shower with lukewarm water less than 10 minutes ; Ducha con agua tibia menos de 10 minutos   Use Dove unscented soap to groin and armpits and neck; Use jabón sin perfume Dove para la chloe, las axilas y el jose alejandro                             Pat dry after shower  Do not harshly rub; Seque con palmaditas después de la ducha  No frote con dureza  Immediately moisturize with heavy emollient; Hidratar inmediatamente con emoliente pesado  BEST - OINTMENTS, such as Vaseline, Aquaphor, Cerave healing ointment; BEST - UNGÜENTOS, mayelin Vaselina, Aquaphor, Ungüento curativo [employer], such as [place], Cetaphil, VaniCREAM, Aveeno, Eucerin; MEJOR - CREMAS, mayelin Cerave, Cetaphil, VaniCREAM, Aveeno, Eucerin  AVOID LOTIONS, too thin, most things in pump; EVITE LAS LOCIONES, demasiado delgadas, la mayoría de las cosas en la bomba        Moisturize twice a day; Hidratar dos veces al día  Dry skin refers to skin that feels dry to touch  Dry skin has a dull surface with a rough, scaly quality  The skin is less pliable and cracked  When dryness is severe, the skin may become inflamed and fissured  Although any body site can be dry, dry skin tends to affect the shins more than any other site  Dry skin is lacking moisture in the outer horny cell layer (stratum corneum) and this results in cracks in the skin surface  Dry skin is also called xerosis, xeroderma or asteatosis (lack of fat)  It can affect males and females of all ages   There is some racial variability in water and lipid content of the skin   Dry skin that starts in early childhood may be one of about 20 types of ichthyosis (fish-scale skin)  There is often a family history of dry skin   Dry skin is commonly seen in people with atopic dermatitis   Nearly everyone > 60 years has dry skin  Dry skin that begins later may be seen in people with certain diseases and conditions   Postmenopausal women   Hypothyroidism   Chronic renal disease    Malnutrition and weight loss    Subclinical dermatitis    Treatment with certain drugs such as oral retinoids, diuretics and epidermal growth factor receptor inhibitors    People exposed to a dry environment may experience dry skin   Low humidity: in desert climates or cool, windy conditions    Excessive air conditioning    Direct heat from a fire or fan heater    Excessive bathing    Contact with soap, detergents and solvents    Inappropriate topical agents such as alcohol    Frictional irritation from rough clothing or abrasives    Dry skin is due to abnormalities in the integrity of the barrier function of the stratum corneum, which is made up of corneocytes   There is an overall reduction in the lipids in the stratum corneum   Ratio of ceramides, cholesterol and free fatty acids may be normal or altered   There may be a reduction in the proliferation of keratinocytes   Keratinocyte subtypes change in dry skin with a decrease in keratins K1, K10 and increase in K5, K14     Involucrin (a protein) may be expressed early, increasing cell stiffness   The result is retention of corneocytes and reduced water-holding capacity  The inherited forms of ichthyosis are due to loss of function mutations in various genes (listed in parentheses below)  The clinical features of ichthyosis depend on the specific type of ichthyosis:   Ichthyosis vulgaris (FLG)      Recessive X-linked ichthyosis (STS)    Autosomal recessive congenital ichthyosis (ABCA12, TGM1, ALOXE3)  Keratinopathic ichthyoses (Wandra Ewi, KRT2)    Acquired ichthyosis may be due to:   Metabolic factors: thyroid deficiency    Illness: lymphoma, internal malignancy, sarcoidosis, HIV infection    Drugs: nicotinic acid, kava, protein kinase inhibitors (eg EGFR inhibitors), hydroxyurea  Complications of dry skin:  Dry areas of skin may become itchy, indicating a form of eczema/dermatitis has developed   Atopic eczema -- especially in people with ichthyosis vulgaris    Eczema craquelé -- especially in elderly people  Also called asteatotic eczema    A dry form of nummular dermatitis/discoid eczema -- especially in people that wash their skin excessively  When the dry skin of an elderly person is itchy without a visible rash, it is sometimes called winter itch, 7th age itch, senile pruritus or chronic pruritus of the elderly  Other complications of dry skin may include:   Skin infection when bacteria or viruses penetrate a break in the skin surface    Overheating, especially in some forms of ichthyosis    Food allergy, eg, to peanuts, has been associated with filaggrin mutations    Contact allergy, eg, to nickel, has also been correlated with barrier function defects  How is the type of dry skin diagnosed? The type of dry skin is diagnosed by careful history and examination  In children:   Family history    Age of onset    Appearance at birth, if known    Distribution of dry skin    Other features, eg eczema, abnormal nails, hair, dentition, sight, hearing  In adults:   Medical history    Medications and topical preparations    Bathing frequency and use of soap    Evaluation of environmental factors that may contribute to dry skin  What is the treatment for dry skin? The mainstay of treatment of dry skin and ichthyosis is moisturisers/emollients   They should be applied liberally and often enough to:   Reduce itch    Improve the barrier function    Prevent entry of irritants, bacteria    Reduce transepidermal water loss  When considering which emollient is most suitable, consider:   Severity of the dryness    Tolerance    Personal preference    Cost and availability  Emollients generally work best if applied to damp skin, if pH is below 7 (acidic), and if containing humectants such as urea or propylene glycol  Additional treatments include:   Topical steroid if itchy or there is dermatitis -- choose an emollient base    Topical calcineurin inhibitors if topical steroids are unsuitable  How can dry skin be prevented? Eliminate aggravating factors:   Reduce the frequency of bathing   A humidifier in winter and air conditioner in summer    Compare having a short shower with a prolonged soak in a bath   Use lukewarm, not hot, water   Replace standard soap with a substitute such as a synthetic detergent cleanser, water-miscible emollient, bath oil, anti-pruritic tar oil, colloidal oatmeal etc     Apply an emollient liberally and often, particularly shortly after bathing, and when itchy  The drier the skin, the thicker this should be, especially on the hands  What is the outlook for dry skin? A tendency to dry skin may persist life-long, or it may improve once contributing factors are controlled  Assessment and Plan:  Based on a thorough discussion of this condition and the management approach to it (including a comprehensive discussion of the known risks, side effects and potential benefits of treatment), the patient (family) agrees to implement the following specific plan:   Recommend applying Aquaphor to the areas of the breast; abdomen and bilateral hands   Recomendamos que te apliques Aquaphor en las areas [employer], abdomen y [place]   En las michelle aplica Aquaphor despues de labar platos   Recomendamos que cambies el jabon de aleena para laura mas sensitivo

## 2022-02-15 NOTE — PROGRESS NOTES
[facility] Note     Patient Name: Riana Rodriguez  Encounter Date: 02/15/22     Have you been cared for by a [facility] in the last 3 years and, if so, which one? No    · Have you traveled outside of the [address] [place] [address] in the past 3 months or outside of the [facility] area in the last 2 weeks? No     May we call your Preferred Phone number to discuss your specific medical information? Yes     May we leave a detailed message that includes your specific medical information? Yes      Today's Chief Concerns:   Concern #1:  Rash    Concern #2:      Past Medical History:  Have you personally ever had or currently have any of the following? · Skin cancer (such as Melanoma, Basal Cell Carcinoma, Squamous Cell Carcinoma? (If Yes, please provide more detail)- No  · Eczema: No  · Psoriasis: No  · HIV/AIDS: No  · Hepatitis B or C: No  · Tuberculosis: No  · Systemic Immunosuppression such as Diabetes, Biologic or Immunotherapy, Chemotherapy, Organ Transplantation, Bone Marrow Transplantation (If YES, please provide more detail): No  · Radiation Treatment (If YES, please provide more detail): No  · Any other major medical conditions/concerns? 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 N/A     Family History:  Have any of your "first degree relatives" (parent, brother, sister, or child) had any of the following       · Skin cancer such as Melanoma or Merkel Cell Carcinoma or Pancreatic Cancer? No  · Eczema, Asthma, Hay Fever or Seasonal Allergies: YES, Son- Eczema   · Psoriasis or Psoriatic Arthritis: No  · Do any other medical conditions seem to run in your family? If Yes, what condition and which relatives?   No    Current Medications:         Current Outpatient Medications:     co-enzyme Q-10 30 MG capsule, Take 1 capsule (30 mg total) by mouth daily, Disp: 30 capsule, Rfl: 2    docusate sodium (COLACE) 100 mg capsule, Take 1 capsule (100 mg total) by mouth 2 (two) times a day, Disp: 60 capsule, Rfl: 6    fluticasone (FLONASE) 50 mcg/act nasal spray, Week 1  administer 2 sprays in each nostril per day  Week 2 administer  1 spray in each nostril daily  , Disp: 16 g, Rfl: 3    magnesium oxide (MAG-OX) 400 mg, Take 1 tablet (400 mg total) by mouth daily, Disp: 30 tablet, Rfl: 2    miconazole (MONISTAT 7) 100 mg vaginal suppository, Insert 1 suppository (100 mg total) into the vagina daily at bedtime, Disp: 7 suppository, Rfl: 0    Prenatal Vit-Fe Fumarate-FA (PRENATAL PO), Take by mouth, Disp: , Rfl:       Review of Systems:  Have you recently had or currently have any of the following? If YES, what are you doing for the problem? · Fever, chills or unintended weight loss: No  · Sudden loss or change in your vision: No  · Nausea, vomiting or blood in your stool: No  · Painful or swollen joints: No  · Wheezing or cough: No  · Changing mole or non-healing wound: No  · Nosebleeds: No  · Excessive sweating: No  · Easy or prolonged bleeding? No  · Over the last 2 weeks, how often have you been bothered by the following problems? · Taking little interest or pleasure in doing things: 1 - Not at All  · Feeling down, depressed, or hopeless: 1 - Not at All  · Rapid heartbeat with epinephrine:  No    · FEMALES ONLY:    · Are you pregnant or planning to become pregnant? YES  · Are you currently or planning to be nursing or breast feeding? YES    · Any known allergies? · No Known Allergies      Physical Exam:     Was a chaperone (Derm Clinical Assistant) present throughout the entire Physical Exam? Yes     Did the Dermatology Team specifically  the patient on the importance of a Full Skin Exam to be sure that nothing is missed clinically?  Yes}  o Did the patient ultimately request or accept a Full Skin Exam?  NO      CONSTITUTIONAL:   Vitals: 02/15/22 0833   Temp: 98 7 °F (37 1 °C)   Weight: 75 9 kg (167 lb 6 4 oz)       PSYCH: Normal mood and affect  EYES: Normal conjunctiva  RESPIRATORY: Normal respirations      Assessment and Plan by Diagnosis:    History of Present Condition:     Duration:  How long has this been an issue for you?    o  about 2 years   Location Affected:  Where on the body is this affecting you?    o  Neck, under breast area and abdomen    Quality:  Is there any bleeding, pain, itch, burning/irritation, or redness associated with the skin lesion? o  itching, irritation    Severity:  Describe any bleeding, pain, itch, burning/irritation, or redness on a scale of 1 to 10 (with 10 being the worst)  o     Timing:  Does this condition seem to be there pretty constantly or do you notice it more at specific times throughout the day? o  constant    Context:  Have you ever noticed that this condition seems to be associated with specific activities you do?    o  unknown    Modifying Factors:    o Anything that seems to make the condition worse?    -  if stops applying cortizone  o What have you tried to do to make the condition better? -  Cortizone 10 over the counter    Associated Signs and Symptoms:  Does this skin lesion seem to be associated with any of the following:  o  SL AMB DERM SIGNS AND SYMPTOMS: Itching and Scratching   XEROSIS ("DRY SKIN")    Physical Exam:   Anatomic Location Affected:  Bilateral hands, upper extremities    Morphological Description:  Scaly normal skin    Pertinent Positives:   Pertinent Negatives: Additional History of Present Condition:  Discovered upon skin exam     Assessment and Plan:  Based on a thorough discussion of this condition and the management approach to it (including a comprehensive discussion of the known risks, side effects and potential benefits of treatment), the patient (family) agrees to implement the following specific plan:   Advise gentle skin care as follows: Aconseje un cuidado suave de la piel de la siguiente manera:   Shower with lukewarm water less than 10 minutes ; Ducha con agua tibia menos de 10 minutos   Use Dove unscented soap to groin and armpits and neck; Use jabón sin perfume Dove para la chloe, las axilas y el jose alejandro                             Pat dry after shower  Do not harshly rub; Seque con palmaditas después de la ducha  No frote con dureza  Immediately moisturize with heavy emollient; Hidratar inmediatamente con emoliente pesado  BEST - OINTMENTS, such as Vaseline, Aquaphor, Cerave healing ointment; BEST - UNGÜENTOS, mayelin Vaselina, Aquaphor, Ungüento curativo [employer], such as [place], Cetaphil, VaniCREAM, Aveeno, Eucerin; MEJOR - CREMAS, mayelin Cerave, Cetaphil, VaniCREAM, Aveeno, Eucerin  AVOID LOTIONS, too thin, most things in pump; EVITE LAS LOCIONES, demasiado delgadas, la mayoría de las cosas en la bomba        Moisturize twice a day; Hidratar dos veces al día  Dry skin refers to skin that feels dry to touch  Dry skin has a dull surface with a rough, scaly quality  The skin is less pliable and cracked  When dryness is severe, the skin may become inflamed and fissured  Although any body site can be dry, dry skin tends to affect the shins more than any other site  Dry skin is lacking moisture in the outer horny cell layer (stratum corneum) and this results in cracks in the skin surface  Dry skin is also called xerosis, xeroderma or asteatosis (lack of fat)  It can affect males and females of all ages  There is some racial variability in water and lipid content of the skin   Dry skin that starts in early childhood may be one of about 20 types of ichthyosis (fish-scale skin)  There is often a family history of dry skin   Dry skin is commonly seen in people with atopic dermatitis   Nearly everyone > 60 years has dry skin      Dry skin that begins later may be seen in people with certain diseases and conditions   Postmenopausal women   Hypothyroidism   Chronic renal disease    Malnutrition and weight loss    Subclinical dermatitis    Treatment with certain drugs such as oral retinoids, diuretics and epidermal growth factor receptor inhibitors    People exposed to a dry environment may experience dry skin   Low humidity: in desert climates or cool, windy conditions    Excessive air conditioning    Direct heat from a fire or fan heater    Excessive bathing    Contact with soap, detergents and solvents    Inappropriate topical agents such as alcohol    Frictional irritation from rough clothing or abrasives    Dry skin is due to abnormalities in the integrity of the barrier function of the stratum corneum, which is made up of corneocytes   There is an overall reduction in the lipids in the stratum corneum   Ratio of ceramides, cholesterol and free fatty acids may be normal or altered   There may be a reduction in the proliferation of keratinocytes   Keratinocyte subtypes change in dry skin with a decrease in keratins K1, K10 and increase in K5, K14     Involucrin (a protein) may be expressed early, increasing cell stiffness   The result is retention of corneocytes and reduced water-holding capacity  The inherited forms of ichthyosis are due to loss of function mutations in various genes (listed in parentheses below)  The clinical features of ichthyosis depend on the specific type of ichthyosis:   Ichthyosis vulgaris (FLG)   Recessive X-linked ichthyosis (STS)    Autosomal recessive congenital ichthyosis (ABCA12, TGM1, ALOXE3)    Keratinopathic ichthyoses (KRT1, KRT10, KRT2)    Acquired ichthyosis may be due to:   Metabolic factors: thyroid deficiency    Illness: lymphoma, internal malignancy, sarcoidosis, HIV infection    Drugs: nicotinic acid, kava, protein kinase inhibitors (eg EGFR inhibitors), hydroxyurea      Complications of dry skin:  Dry areas of skin may become itchy, indicating a form of eczema/dermatitis has developed   Atopic eczema -- especially in people with ichthyosis vulgaris    Eczema craquelé -- especially in elderly people  Also called asteatotic eczema    A dry form of nummular dermatitis/discoid eczema -- especially in people that wash their skin excessively  When the dry skin of an elderly person is itchy without a visible rash, it is sometimes called winter itch, 7th age itch, senile pruritus or chronic pruritus of the elderly  Other complications of dry skin may include:   Skin infection when bacteria or viruses penetrate a break in the skin surface    Overheating, especially in some forms of ichthyosis    Food allergy, eg, to peanuts, has been associated with filaggrin mutations    Contact allergy, eg, to nickel, has also been correlated with barrier function defects  How is the type of dry skin diagnosed? The type of dry skin is diagnosed by careful history and examination  In children:   Family history    Age of onset    Appearance at birth, if known    Distribution of dry skin    Other features, eg eczema, abnormal nails, hair, dentition, sight, hearing  In adults:   Medical history    Medications and topical preparations    Bathing frequency and use of soap    Evaluation of environmental factors that may contribute to dry skin  What is the treatment for dry skin? The mainstay of treatment of dry skin and ichthyosis is moisturisers/emollients  They should be applied liberally and often enough to:   Reduce itch    Improve the barrier function    Prevent entry of irritants, bacteria    Reduce transepidermal water loss  When considering which emollient is most suitable, consider:   Severity of the dryness    Tolerance    Personal preference    Cost and availability      Emollients generally work best if applied to damp skin, if pH is below 7 (acidic), and if containing humectants such as urea or propylene glycol  Additional treatments include:   Topical steroid if itchy or there is dermatitis -- choose an emollient base    Topical calcineurin inhibitors if topical steroids are unsuitable  How can dry skin be prevented? Eliminate aggravating factors:   Reduce the frequency of bathing   A humidifier in winter and air conditioner in summer    Compare having a short shower with a prolonged soak in a bath   Use lukewarm, not hot, water   Replace standard soap with a substitute such as a synthetic detergent cleanser, water-miscible emollient, bath oil, anti-pruritic tar oil, colloidal oatmeal etc     Apply an emollient liberally and often, particularly shortly after bathing, and when itchy  The drier the skin, the thicker this should be, especially on the hands  What is the outlook for dry skin? A tendency to dry skin may persist life-long, or it may improve once contributing factors are controlled  LICHENOID DERMATITIS VERSUS LICHEN PLANUS  Physical Exam:   Anatomic Location Affected:  Breast and and left upper quadrant    Morphological Description:  Monomorphic lichenoid flat papules with serpiginous white fine scale, somewhat shiny in appearance, in clusters   Pertinent Positives:   Pertinent Negatives: Additional History of Present Condition:  Discovered during exam; patient reports spreading to breast area as well  Assessment and Plan:  Based on a thorough discussion of this condition and the management approach to it (including a comprehensive discussion of the known risks, side effects and potential benefits of treatment), the patient (family) agrees to implement the following specific plan:   Recommend applying Aquaphor to the areas of the breast; abdomen and bilateral hands   To consider biopsy later on   To consider TCS   Recomendamos que te apliques Aquaphor en las areas [employer], abdomen y [place]   En las michelle aplica Aquaphor despues de labar platos   Recomendamos que cambies el jabon de aleena para laura mas sensitivo                                                                                          Dr Alem Perea    I,:  Vineet Jefferson MA am acting as a scribe while in the presence of the attending physician :       I,:  Mari Hernandez MD personally performed the services described in this documentation    as scribed in my presence :

## 2022-02-18 ENCOUNTER — NURSE TRIAGE (OUTPATIENT)
Dept: OTHER | Facility: OTHER | Age: 32
End: 2022-02-18

## 2022-02-18 NOTE — TELEPHONE ENCOUNTER
Regarding: requires interpretation 23 wks / water from vagina / pressure   ----- Message from [facility] sent at 2/18/2022  3:42 PM EST -----  "I am calling because I felt a pressure from my baby and water started coming out from my vagina  I am 23 weeks pregnant   I wanted to know if this is normal or not  "

## 2022-02-21 ENCOUNTER — TELEPHONE (OUTPATIENT)
Dept: PERINATAL CARE | Facility: OTHER | Age: 32
End: 2022-02-21

## 2022-02-21 NOTE — TELEPHONE ENCOUNTER
Left patient a message that her MFM appointment had to be rescheduled to 4/15 @ 10am   The new time, date and location were provided  The patient has been instructed to please call us back at [phone] with any questions or concerns

## 2022-03-01 PROBLEM — Z3A.25 25 WEEKS GESTATION OF PREGNANCY: Status: ACTIVE | Noted: 2022-01-06

## 2022-03-01 PROBLEM — Z34.92 SECOND TRIMESTER PREGNANCY: Status: ACTIVE | Noted: 2022-03-01

## 2022-03-01 NOTE — PROGRESS NOTES
Mary Quiñones presents today for routine OB visit at 25w1d  [place] Interpretation by Sophia Red  Blood Pressure: 106/68  Wt=75 8 kg (167 lb); Body mass index is 28 67 kg/m² ; TWG=Not found  Fetal Heart Rate: 140; Fundal Height (cm): 25 cm  Abdomen: gravid, soft, non-tender  She reports was treated for yeast at last visit still is having symptoms  Exam-white thick clumpy discharge present, positive for yeast hyphae  Will treat with miconazole 2% cream x7 nights-reviewed with patient to call if symptoms not resolved  Denies uterine contractions or persistent cramping  Denies vaginal bleeding or leaking of fluid  Reports fetal movement  Has had Covid vaccine x2, due for Covid booster - pt aware,  has had flu vaccine  Had normal MSAFP, she declined genetic tests  Lab slip for 28 wk labs given, O+ blood ype  Last US was 2/2/2022, [facility],   Scheduled for ultrasound growth scan at 32 wks  Scheduled for 4/15/2022  GBS bacteriuria- treat in labor  Reviewed common discomforts of pregnancy in second trimester and warning signs  Advised to continue medications and return in 3 weeks  COVID 19 precautions were discussed with patient, reviewed symptoms, masking, hygiene, social distancing, avoid high risk gatherings, patient to call office with questions or concerns      Current Outpatient Medications   Medication Instructions    co-enzyme Q-10 30 mg, Oral, Daily    docusate sodium (COLACE) 100 mg, Oral, 2 times daily    fluticasone (FLONASE) 50 mcg/act nasal spray Week 1  administer 2 sprays in each nostril per day  Week 2 administer  1 spray in each nostril daily      magnesium oxide (MAG-OX) 400 mg, Oral, Daily    miconazole (MONISTAT-7) 2 % vaginal cream 1 applicator, Vaginal, Daily at bedtime    Prenatal Vit-Fe Fumarate-FA (PRENATAL PO) Oral         Pregnancy Problems (from 01/06/22 to present)     Problem Noted Resolved    GBS bacteriuria 2/7/2022 by KAREL Abraham No    Overview Signed 2/7/2022  3:52 PM by KAREL Damon     Treat in labor         21 weeks gestation of pregnancy 9/2/4341 by Masoud Diego MD No

## 2022-03-01 NOTE — PATIENT INSTRUCTIONS
El embarazo de la semana 23 a la 26   CUIDADO AMBULATORIO:   Qué cambios están ocurriendo en martínez cuerpo: Ahora usted está cerca o al principio del tercer trimestre  El tercer trimestre comienza a las 24 semanas y concluye al momento del [place]  Conforme martínez bebé crece más, usted podría desarrollar ciertos síntomas  Estos podrían incluir dolor en martínez espalda o en la parte inferior de los costados de martínez abdomen  Es posible que también se le formen estrías en martínez abdomen, senos, muslos o glúteos  Usted también podría presentar estreñimiento  Busque atención médica de inmediato si:  · Usted presenta un guadalupe dolor de jorge que no desaparece  · Usted tiene cambios en la visión nuevos o en aumento, mayelin visión borrosa o con manchas  · Usted tiene inflamación nueva o creciente en martínez vita o michelle  · Usted tiene manchado o sangrado vaginal     · Usted rompe sai o siente un chorro o gotas de agua tibia que le está bajando por martínez vagina  Llame a martínez médico u obstetra si:  · Usted tiene calambres, presión o tensión abdominal     · Usted tiene un cambio en la secreción vaginal     · Usted tiene un sangrado leve  · Usted tiene escalofríos o fiebre  · Usted tiene comezón, ardor o dolor vaginal     · Usted tiene cristian secreción vaginal amarillenta, verdosa, janine o de [employer]  · Usted tiene dolor o ardor al David Nasuti, orina menos de lo habitual o tiene [place] rosada o sanguinolenta  · Usted tiene preguntas o inquietudes acerca de martínez condición o cuidado  Cómo cuidarse en esta etapa de martínez embarazo:      · Consuma alimentos saludables y variados  Alimentos saludables incluyen frutas, verduras, panes de shey integral, alimentos lácteos bajos en grasa, frijoles, connie magras y pescado  [place] líquidos mayelin se le haya indicado  Pregunte cuánto líquido debe tiffanie cada día y cuáles líquidos son los más adecuados para usted  Limite el consumo de cafeína a menos de [address]   Limite el consumo de pescado a 2 porciones cada semana  Escoja pescado con concentraciones bajas de nancy mayelin atún al natural enlatado, camarón, salmón, bacalao o tilapia  No coma pescado con concentraciones altas de nancy mayelin pez chasidy, caballa gigante, pargo rayado y tiburón  · Controle martínez dolor de espalda  No esté de pie por largos periodos de tiempo ni levante objetos pesados  Use cristian buena postura mientras esté de pie, se agache o se doble  Use zapatos de tacón bajo con un buen soporte  Descansar puede también ayudarla a aliviar el dolor de espalda  Pregunte a martínez médico acerca de ejercicios que usted pueda hacer para fortalecer los músculos de martínez espalda  · [id number] [place]  Martínez necesidad de ciertas vitaminas y [facility], mayelin el ácido fólico, aumenta whitley el [facility]  Las vitaminas prenatales proporcionan algunas de las vitaminas y minerales adicionales que usted necesita  Las vitaminas prenatales también podrían ayudar a disminuir el riesgo de ciertos defectos de nacimiento  · Consulte con martínez médico acerca de hacer ejercicio  El ejercicio moderado puede ayudarla a mantenerse en forma  Martínez médico la ayudará a planear un programa de ejercicios que sea seguro para usted whitley martínez [facility]  · No fume  Fumar aumenta el riesgo de aborto espontáneo y otros problemas de su whitley martínez [facility]  Fumar puede causar que martínez bebé nazca antes de tiempo o que pese menos al nacer  Solicite información a martínez médico si usted necesita ayuda para dejar de fumar  · No consuma alcohol  El alcohol pasa de martínez cuerpo al bebé a través de la placenta  Puede afectar el desarrollo del cerebro de martínez bebé y provocar el síndrome de alcoholismo fetal (SAF)  El SAF es un lauren de condiciones que causan problemas [place], de comportamiento y de crecimiento  · Consulte con martínez médico antes de tiffanie cualquier medicamento   Muchos medicamentos pueden perjudicar a martínez bebé si usted los pilar mientras está [place]  No tome ningún medicamento, vitaminas, hierbas o suplementos sin kosta consultar con salmon Leanor Bennett  use drogas ilegales o de la mckeon (mayelin marihuana o cocaína) mientras está embarazada  Consejos de seguridad whitley el embarazo:  · Evite jacuzzis y saunas  No use un jacuzzi o un sauna mientras usted está embarazada, especialmente whitley el primer trimestre  Los [facility] y los saunas aumentan la temperatura de salmon bebé y el riesgo de defectos de nacimiento  · Evite la toxoplasmosis  [place] es cristian infección causada por comer carne cruda o estar cerca del excremento de un lisa infectado  [place] puede causar malformaciones congénitas, aborto espontáneo y Tadeo Schein  Lávese las michelle después de tocar carne cruda  Asegúrese de que la carne esté amadeo cocida antes de comerla  Evite los huevos crudos y la Jur[place] Reas  Use guantes o pida que alguien la ayude a limpiar la caja de arena del lisa mientras usted [place]  Cambios que están ocurriendo con salmon bebé: Para las 26 semanas, salmon bebé pesará alrededor de 2 libras  Salmon bebé medirá alrededor de 10 pulgadas de lizette desde el kenneth de la jorge hasta la rabadilla (parte inferior del bebé)  Los movimientos de salmon bebé son mucho más lorenzo en esta etapa  Los ojos de salmon bebé norm están completamente formados y pueden abrirse parcialmente  Salmon bebé también duerme y se despierta  Lo que necesita saber acerca del cuidado prenatal: Salmon médico le revisará salmon presión arterial y [place]  Es posible que también necesite lo siguiente:  · Un examen de orina también podría realizarse para revisarle el azúcar y la proteína  Estas son señales de diabetes gestacional o de infección  La proteína en salmon orina también podría ser cristian señal de preeclampsia  La preeclampsia es cristian condición que puede desarrollarse whitley la semana 21 o después en salmon embarazo   Esta provoca presión arterial keshia y puede provocar problemas con terrence riñones y [employer]  · La detección de diabetes gestacional podría realizarse  Martínez médico le puede ordenar la curva de tolerancia a la glucosa (OGTT) de 1 paso o de 2 pasos  ? Examen de tolerancia a la glucosa en 1 paso: A usted le revisarán el nivel de azúcar en la dawna después de que no haya comido por 8 horas (en ayunas)  Luego le darán a tiffanie cristian solución de glucosa  Le examinarán el nivel de glucosa nuevamente 1 hora y 2 horas después de terminar la bebida  ? Examen de tolerancia a la glucosa en 2 paso: Usted no tiene que ayunar para la primera parte del examen  Usted se tomará la bebida de glucosa a cualquier hora del día  A usted le revisarán el nivel de azúcar en la dawna al cabo de 1 hora  En isela que el nivel de azúcar esté más alto que cierto nivel, le ordenarán otro examen  Usted tendrá que ayunar para que le revisen el azúcar en martínez dawna  Usted se tomará la bebida de glucosa  Le examinarán la dawna de nuevo 1 [place], 2 horas y 3 horas después de terminarse la bebida de glucosa  · La altura uterina es cristian medición del útero para controlar el desarrollo de martínez bebé  [employer] por lo general es igual al número de [facility] que usted tiene de embarazo  · El ritmo cardíaco de martínez bebé será revisado  Acuda a terrence consultas de control con martínez médico u obstetra según le indicaron: Anote terrence preguntas para que se acuerde de hacerlas whitely terrence visitas  © Copyright [employer] 2022 Information is for End User's use only and may not be sold, redistributed or otherwise used for commercial purposes  All illustrations and images included in CareNotes® are the copyrighted property of A D A [employer]  [address] es sólo para uso en educación  Martínez intención no es darle un consejo médico sobre enfermedades o tratamientos  Colsulte con martínez Nilsa Poncho farmacéutico antes de seguir cualquier régimen médico para saber si es seguro y efectivo para usted    Movimiento fetal LO QUE NECESITA SABER:   Los movimientos fetales son las patadas, vueltas e hipo del bebé en el [place]  Es posible que usted empiece a sentir estos movimientos aproximadamente a las [address]  A medida que martínez bebé crezca, los movimientos se sienten más lorenzo y son [place] frecuentes  Los movimientos del feto comprueban que martínez bebé en el útero está recibiendo el oxígeno y los nutrientes necesarios antes de nacer  La reducción de movimientos fetales podría señalar un problema de la su de martínez bebé  INSTRUCCIONES SOBRE EL SHAZIA HOSPITALARIA:   Leland Deleon a terrence consultas de control con martínez médico u obstetra según le indicaron: Anote terrence preguntas para que se acuerde de hacerlas whitley terrence visitas  Movimientos normales del feto: La actividad del ivone en el útero puede describirse en 4 estados, del menos activo al [place] na [facility]  Whitley el estado de sueño [facility], es probable que martínez berna por nacer permanezca quieto un benedicto de 2 horas  Whitley el estado de [facility], martínez bebé patalea, da vueltas y se mueve con frecuencia  Whitley el estado vigilia tranquila, martínez berna podría solamente  terrence ojos  El estado despierto activo incluye patadas lorenzo y vueltas  Lo que afecta el movimiento fetal: Es posible que sienta a martínez bebé moverse más después de que usted haya comido o bebido cafeína  Es probable que usted sienta menos movimientos de martínez bebé en el útero cuando está más [place], mayelin cuando hace ejercicio  También podría sentir menos movimientos del feto si usted es [place]  Ciertos medicamentos pueden afectar los movimientos de martínez bebé  Infórmele a martínez médico los medicamentos que pilar  Monitoreo de los movimientos del bebé en la casa: La mayoría de los movimientos de martínez bebé en el útero se notan cuando usted se acuesta silenciosamente de lado  Es probable que martínez médico le pida que cuente los movimientos del feto whitley 2 horas  Podría pedirle que registre el tiempo que martínez bebé dura para realizar 10 movimientos  Mantenga un registro de los movimientos de martínez bebé  Comuníquese con martínez médico u obstetra si:  · Tarda más de lo usual para sentir 10 movimientos de martínez bebé en el útero  · Usted no siente a martínez bebé moverse en el útero por lo menos 10 veces cada 2 horas  · La piel de terrence michelle, pies y alrededor de terrence ojos se encuentra más inflamada de lo normal     · Usted tiene dolor de jorge whitley por lo menos 24 horas  · Le aparecen puntos rojos pequeñitos en la piel  · Martínez estómago se siente sensible al aplicarle presión  · Usted tiene preguntas o inquietudes acerca de martínez condición o cuidado  Regrese a la manuel de emergencias si:  · Usted no siente a martínez bebé en el útero moverse por 12 horas  · Usted siente calambres o dolor jann en el abdomen  · Usted tiene sangrado vaginal abundante  · Usted tiene dolor de [place] severo y no puede arnaldo con claridad  · Usted tiene dificultad para respirar o está vomitando  · Usted sufre cristian convulsión  © Copyright [address] 2022 Information is for End User's use only and may not be sold, redistributed or otherwise used for commercial purposes  All illustrations and images included in CareNotes® are the copyrighted property of A D A [employer]  or [address] [place] [address] es sólo para uso en educación  Martínez intención no es darle un consejo médico sobre enfermedades o tratamientos  Colsulte con martínez Sapna Yancey farmacéutico antes de seguir cualquier régimen médico para saber si es seguro y efectivo para usted  Candidiasis (infección por hongos)   LO QUE NECESITA SABER:   La infección por hongos, o candidiasis vaginal, cristian infección vaginal común  La infección por hongos es causada por un hongo o microorganismo levaduriforme  Los hongos se encuentran normalmente en la vagina  Muchos hongos pueden causar [place] Jeremiah    INSTRUCCIONES SOBRE EL SHAZIA HOSPITALARIA:   Llame a marítnez médico o ginecólogo si:  · Usted tiene fiebre y escalofríos  · Usted desarrolla un dolor abdominal o pélvico     · La secreción contiene dawna y no es de la menstruación  · Kim signos y [address], [address]  · Elaine tiene preguntas o inquietudes acerca de martínez condición o cuidado  Medicamentos:  · Los medicamentos ayudan a tratar la infección por el hongo de la cándida y a disminuir la inflamación  El [place] puede venir en presentación de píldora, crema, ungüento, comprimido o supositorio para la vagina  · [place] kim medicamentos mayelin se le haya indicado  Consulte con martínez médico si usted stefanie que martínez medicamento no le está ayudando o si presenta efectos secundarios  Infórmele si es alérgico a algún medicamento  Mantenga cristian lista actualizada de los [place], las vitaminas y los productos herbales que pilar  Incluya los siguientes datos de los medicamentos: cantidad, frecuencia y motivo de administración  Traiga con usted la lista o los envases de las píldoras a kim citas de seguimiento  Lleve la lista de los medicamentos con usted en isela de cristian emergencia  Mantenga martínez vagina saludable:  · Limpie alrededor del área genital con agua tibia y un jabón suave todos los GRASSE  No coloque jabón dentro de la vagina  Después de lavada, séquela suavemente  No use jacuzzis  El calor y la humedad de los jacuzzis pueden aumentar el riesgo de otra candidiasis  · Límpiese siempre de adelante hacia atrás después de usar el inodoro  [place] marlon la diseminación de bacterias desde el área rectal hacia la vagina  · No use ropa ni lencería apretada whitley largos períodos  Que use ropa interior de [employer]  El algodón ayuda a mantener el área genital seca y no mantiene el calor o la humedad  No use ninguna ropa interior por las noches  · No tome duchas vaginales ni use aerosoles de higiene femenina o hugo de burbujas   No utilice almohadillas o tampones que son perfumados o de colores ni papel higiénico perfumado  · No tenga relaciones sexuales hasta que terrence síntomas hayan desaparecido  Zainab que martínez rosetta use un preservativo hasta que usted complete el [place]  · Consulte con martínez médico acerca de las opciones de anticonceptivos, si es necesario  Los condones de látex y los diafragmas tienen un gel que [employer] espermatozoides  Ambos pueden irritar el área genital     Leland Deleon a terrence consultas de control con martínez médico o ginecólogo según le indicaron: Anote terrence preguntas para que se acuerde de hacerlas whitley terrence visitas  © Copyright [employer] 2022 Information is for End User's use only and may not be sold, redistributed or otherwise used for commercial purposes  All illustrations and images included in CareNotes® are the copyrighted property of A D A [employer]  or [address] [place] [address] es sólo para uso en educación  Martínez intención no es darle un consejo médico sobre enfermedades o tratamientos  Colsulte con martínez [place] Carmelina farmacéutico antes de seguir cualquier régimen médico para saber si es seguro y efectivo para usted

## 2022-03-02 ENCOUNTER — TELEPHONE (OUTPATIENT)
Dept: PERINATAL CARE | Facility: OTHER | Age: 32
End: 2022-03-02

## 2022-03-02 ENCOUNTER — ROUTINE PRENATAL (OUTPATIENT)
Dept: OBGYN CLINIC | Facility: CLINIC | Age: 32
End: 2022-03-02

## 2022-03-02 VITALS
HEIGHT: 64 IN | SYSTOLIC BLOOD PRESSURE: 106 MMHG | HEART RATE: 109 BPM | BODY MASS INDEX: 28.51 KG/M2 | DIASTOLIC BLOOD PRESSURE: 68 MMHG | WEIGHT: 167 LBS

## 2022-03-02 DIAGNOSIS — Z34.92 SECOND TRIMESTER PREGNANCY: Primary | ICD-10-CM

## 2022-03-02 DIAGNOSIS — B37.3 VAGINAL YEAST INFECTION: ICD-10-CM

## 2022-03-02 DIAGNOSIS — Z78.9 LANGUAGE BARRIER: ICD-10-CM

## 2022-03-02 DIAGNOSIS — Z3A.25 25 WEEKS GESTATION OF PREGNANCY: ICD-10-CM

## 2022-03-02 LAB
BV WHIFF TEST VAG QL: NEGATIVE
CLUE CELLS SPEC QL WET PREP: NEGATIVE
PH SMN: 4.5 [PH]
SL AMB POCT WET MOUNT: POSITIVE
T VAGINALIS VAG QL WET PREP: NEGATIVE
YEAST VAG QL WET PREP: POSITIVE

## 2022-03-02 PROCEDURE — 99213 OFFICE O/P EST LOW 20 MIN: CPT | Performed by: NURSE PRACTITIONER

## 2022-03-02 PROCEDURE — 87210 SMEAR WET MOUNT SALINE/INK: CPT | Performed by: NURSE PRACTITIONER

## 2022-03-02 NOTE — TELEPHONE ENCOUNTER
Spoke with patient and confirmed her 4/15 ultrasound MFM appointment had to be rescheduled  Patient verbalized understanding of new time, date and location of appointment  Patient denies further questions

## 2022-03-22 ENCOUNTER — APPOINTMENT (OUTPATIENT)
Dept: LAB | Facility: CLINIC | Age: 32
End: 2022-03-22

## 2022-03-22 DIAGNOSIS — Z3A.25 25 WEEKS GESTATION OF PREGNANCY: ICD-10-CM

## 2022-03-22 LAB
BASOPHILS # BLD AUTO: 0.02 THOUSANDS/ΜL (ref 0–0.1)
BASOPHILS NFR BLD AUTO: 0 % (ref 0–1)
EOSINOPHIL # BLD AUTO: 0.09 THOUSAND/ΜL (ref 0–0.61)
EOSINOPHIL NFR BLD AUTO: 1 % (ref 0–6)
ERYTHROCYTE [DISTWIDTH] IN BLOOD BY AUTOMATED COUNT: 13.3 % (ref 11.6–15.1)
GLUCOSE 1H P 50 G GLC PO SERPL-MCNC: 162 MG/DL (ref 40–134)
HCT VFR BLD AUTO: 34.7 % (ref 34.8–46.1)
HGB BLD-MCNC: 11.3 G/DL (ref 11.5–15.4)
IMM GRANULOCYTES # BLD AUTO: 0.03 THOUSAND/UL (ref 0–0.2)
IMM GRANULOCYTES NFR BLD AUTO: 0 % (ref 0–2)
LYMPHOCYTES # BLD AUTO: 2.18 THOUSANDS/ΜL (ref 0.6–4.47)
LYMPHOCYTES NFR BLD AUTO: 28 % (ref 14–44)
MCH RBC QN AUTO: 28.8 PG (ref 26.8–34.3)
MCHC RBC AUTO-ENTMCNC: 32.6 G/DL (ref 31.4–37.4)
MCV RBC AUTO: 89 FL (ref 82–98)
MONOCYTES # BLD AUTO: 0.35 THOUSAND/ΜL (ref 0.17–1.22)
MONOCYTES NFR BLD AUTO: 4 % (ref 4–12)
NEUTROPHILS # BLD AUTO: 5.24 THOUSANDS/ΜL (ref 1.85–7.62)
NEUTS SEG NFR BLD AUTO: 67 % (ref 43–75)
NRBC BLD AUTO-RTO: 0 /100 WBCS
PLATELET # BLD AUTO: 312 THOUSANDS/UL (ref 149–390)
PMV BLD AUTO: 11.4 FL (ref 8.9–12.7)
RBC # BLD AUTO: 3.92 MILLION/UL (ref 3.81–5.12)
RPR SER QL: NORMAL
WBC # BLD AUTO: 7.91 THOUSAND/UL (ref 4.31–10.16)

## 2022-03-22 PROCEDURE — 86592 SYPHILIS TEST NON-TREP QUAL: CPT | Performed by: NURSE PRACTITIONER

## 2022-03-22 PROCEDURE — 36415 COLL VENOUS BLD VENIPUNCTURE: CPT | Performed by: NURSE PRACTITIONER

## 2022-03-22 PROCEDURE — 82950 GLUCOSE TEST: CPT

## 2022-03-22 PROCEDURE — 85025 COMPLETE CBC W/AUTO DIFF WBC: CPT | Performed by: NURSE PRACTITIONER

## 2022-03-23 ENCOUNTER — TELEPHONE (OUTPATIENT)
Dept: OBGYN CLINIC | Facility: CLINIC | Age: 32
End: 2022-03-23

## 2022-03-31 ENCOUNTER — PATIENT OUTREACH (OUTPATIENT)
Dept: OBGYN CLINIC | Facility: CLINIC | Age: 32
End: 2022-03-31

## 2022-03-31 ENCOUNTER — ROUTINE PRENATAL (OUTPATIENT)
Dept: OBGYN CLINIC | Facility: CLINIC | Age: 32
End: 2022-03-31

## 2022-03-31 VITALS
BODY MASS INDEX: 28.85 KG/M2 | DIASTOLIC BLOOD PRESSURE: 69 MMHG | HEIGHT: 64 IN | SYSTOLIC BLOOD PRESSURE: 108 MMHG | WEIGHT: 169 LBS | HEART RATE: 102 BPM

## 2022-03-31 DIAGNOSIS — B96.89 BACTERIAL VAGINOSIS: Primary | ICD-10-CM

## 2022-03-31 DIAGNOSIS — Z23 NEED FOR TDAP VACCINATION: ICD-10-CM

## 2022-03-31 DIAGNOSIS — R73.09 OTHER ABNORMAL GLUCOSE: ICD-10-CM

## 2022-03-31 DIAGNOSIS — Z34.93 THIRD TRIMESTER PREGNANCY: ICD-10-CM

## 2022-03-31 DIAGNOSIS — N76.0 BACTERIAL VAGINOSIS: Primary | ICD-10-CM

## 2022-03-31 PROBLEM — Z3A.29 29 WEEKS GESTATION OF PREGNANCY: Status: ACTIVE | Noted: 2022-01-06

## 2022-03-31 PROCEDURE — 90471 IMMUNIZATION ADMIN: CPT

## 2022-03-31 PROCEDURE — 90715 TDAP VACCINE 7 YRS/> IM: CPT

## 2022-03-31 PROCEDURE — 99213 OFFICE O/P EST LOW 20 MIN: CPT | Performed by: OBSTETRICS & GYNECOLOGY

## 2022-03-31 RX ORDER — METRONIDAZOLE 500 MG/1
500 TABLET ORAL EVERY 12 HOURS SCHEDULED
Qty: 14 TABLET | Refills: 0 | Status: SHIPPED | OUTPATIENT
Start: 2022-03-31 | End: 2022-04-07

## 2022-03-31 NOTE — ASSESSMENT & PLAN NOTE
Delivery consenet signed 3/31/22  Provided patient with pamphlet regarding different contraception, continue discussion at next visit  RTC in 2 weeks for PNV

## 2022-03-31 NOTE — PROGRESS NOTES
JENNIFER HOLT met with Pt for pre sumanth follow up  Pt reported she is being feeling tired and sleepy  Pt denies other concern except her discharge  JENNIFER HOLT assisted with interpretation during pre sumanth visit  Pt was consented for delivery  Pt to  medication at the pharmacy and will return to the clinic in 2 weeks

## 2022-03-31 NOTE — PROGRESS NOTES
OB/GYN  PN Visit  Farooq Valdivia  [id number]  3/31/2022  11:41 AM  Dr Helene Kaufman MD    S: 36 y o  [id number] 29w3d here for PN visit  OB complaints:  Ctxns: no  Leakage: yes  Bleeding: no  Fetal movement: yes  She reports that she continues to have thick, yellow discharge  She reports no dysuria but does not burning if she does not change her underwear after having the discharge  She was treated with Monistat for yeast infection at last visit but reports no improvement of symptoms       O:  Vitals:    03/31/22 1001   BP: 108/69   Pulse: 102       Gen: no acute distress, nonlabored breathing  Fetal Heart Rate: 145; Fundal Height (cm): 28 cm    KOH: negative  Wet prep: positive for clue cells      A/P:    Problem List Items Addressed This Visit        Other    Third trimester pregnancy     Delivery consenet signed 3/31/22  Provided patient with pamphlet regarding different contraception, continue discussion at next visit  RTC in 2 weeks for PNV         Other abnormal glucose     Abnormal 1h, 3h gtt ordered  Patient advised to complete as soon as possible           Other Visit Diagnoses     Bacterial vaginosis    -  Primary    Relevant Medications    metroNIDAZOLE (FLAGYL) 500 mg tablet            Helene Kaufman MD  3/31/2022  11:41 AM

## 2022-04-12 PROBLEM — O99.810 ABNORMAL GLUCOSE AFFECTING PREGNANCY: Status: ACTIVE | Noted: 2022-04-12

## 2022-04-12 NOTE — PATIENT INSTRUCTIONS
Thank you for choosing us for your  care today  If you have any questions about your ultrasound or care, please do not hesitate to contact us or your primary obstetrician  Some general instructions for your pregnancy are:     Protect against coronavirus: get vaccinated - pregnant women are increased risk of severe COVID  Notify your primary care doctor if you have any symptoms   Exercise: Aim for 22 minutes per day (150 minutes per week) of regular exercise  Walking is great!  Nutrition: aim for calcium-rich and iron-rich foods as well as healthy sources of protein   Learn about Preeclampsia: preeclampsia is a common, serious high blood pressure complication in pregnancy  A blood pressure of 376DRTS (systolic or top number) or 61XWFJ (diastolic or bottom number) is not normal and needs evaluation by your doctor  Aspirin is sometimes prescribed in early pregnancy to prevent preeclampsia in women with risk factors - ask your obstetrician if you should be on this medication   If you smoke, try to reduce how many cigarettes you smoke or try to quit completely  Do not vape   Other warning signs to watch out for in pregnancy or postpartum: chest pain, obstructed breathing or shortness of breath, seizures, thoughts of hurting yourself or your baby, bleeding, a painful or swollen leg, fever, or headache (see AWHONN POST-BIRTH Warning Signs campaign)  If these happen call 911  Itching is also not normal in pregnancy and if you experience this, especially over your hands and feet, potentially worse at night, notify your doctors

## 2022-04-14 ENCOUNTER — ROUTINE PRENATAL (OUTPATIENT)
Dept: OBGYN CLINIC | Facility: CLINIC | Age: 32
End: 2022-04-14

## 2022-04-14 VITALS
HEART RATE: 88 BPM | SYSTOLIC BLOOD PRESSURE: 103 MMHG | BODY MASS INDEX: 28.34 KG/M2 | DIASTOLIC BLOOD PRESSURE: 68 MMHG | WEIGHT: 166 LBS | HEIGHT: 64 IN

## 2022-04-14 DIAGNOSIS — Z78.9 LANGUAGE BARRIER: ICD-10-CM

## 2022-04-14 DIAGNOSIS — Z3A.31 31 WEEKS GESTATION OF PREGNANCY: ICD-10-CM

## 2022-04-14 DIAGNOSIS — Z34.93 THIRD TRIMESTER PREGNANCY: ICD-10-CM

## 2022-04-14 DIAGNOSIS — O99.810 ABNORMAL GLUCOSE AFFECTING PREGNANCY: Primary | ICD-10-CM

## 2022-04-14 PROBLEM — Z75.8 LANGUAGE BARRIER: Status: ACTIVE | Noted: 2022-04-14

## 2022-04-14 PROBLEM — Z60.3 LANGUAGE BARRIER: Status: ACTIVE | Noted: 2022-04-14

## 2022-04-14 PROCEDURE — 99213 OFFICE O/P EST LOW 20 MIN: CPT | Performed by: NURSE PRACTITIONER

## 2022-04-14 NOTE — PROGRESS NOTES
Shreya Whitfield presents today for routine OB visit at 31w3d  Blood Pressure: 103/68  done by Jojo Cloud   She is treated for BV at her last visit- symptoms have improved  Wt=75 3 kg (166 lb); Body mass index is 28 49 kg/m² ; TWG=Not found  Abdomen: gravid, soft, non-tender  She reports feeling pelvic pressure at times   Denies uterine contractions  Denies vaginal bleeding or leaking of fluid  Reports adequate fetal movement of at least 10 movements in 2 hours once daily  Her 1 hour GTT was 162, she needs to complete her fasting 3 hour GTT- reprint given to complete, her hemoglobin was 11 3  Scheduled for ultrasound growth scan scheduled at 32 weeks on 04/15/2022  History of GBS bacteriuria-treatment labor  Reviewed premature labor precautions and fetal kick counts  Contraception- leaning towards depo, also interested in Nexplanon and mirena- information was provided  Breastfeeding or bottle feed  Advised to continue medications and return in 2 weeks  COVID 19 precautions were discussed with patient, reviewed symptoms, masking, hygiene, social distancing, avoid high risk gatherings, patient to call office with questions or concerns  She has had COVID vaccine x2, she is due for booster    [facility] was seen today for routine prenatal visit  Diagnoses and all orders for this visit:    Abnormal glucose affecting pregnancy  To complete her 3 hr GTT  Language barrier  -     Ambulatory referral to social work care management program; Future    Third trimester pregnancy    31 weeks gestation of pregnancy        Current Outpatient Medications   Medication Instructions    co-enzyme Q-10 30 mg, Oral, Daily    docusate sodium (COLACE) 100 mg, Oral, 2 times daily    fluticasone (FLONASE) 50 mcg/act nasal spray Week 1  administer 2 sprays in each nostril per day  Week 2 administer  1 spray in each nostril daily      magnesium oxide (MAG-OX) 400 mg, Oral, Daily    Prenatal Vit-Fe Fumarate-FA (PRENATAL PO) Oral         Pregnancy Problems (from 01/06/22 to present)     Problem Noted Resolved    Other abnormal glucose 3/31/2022 by Emily Etienne MD No    Overview Signed 3/31/2022 11:43 AM by Emily Etienne MD     Abnormal 1h, 3h gtt ordered         Third trimester pregnancy 3/1/2022 by KAREL Brennan No    Overview Signed 3/31/2022 11:42 AM by Emily Etienne MD     Delivery consenet signed 3/31/22  Provided patient with pamphlet regarding different contraception, continue discussion at next visit         GBS bacteriuria 2/7/2022 by KAREL Brennan No    Overview Signed 2/7/2022  3:52 PM by KAREL Brennan     Treat in labor         29 weeks gestation of pregnancy 1/7/3005 by vEi Barrow MD No

## 2022-04-14 NOTE — PATIENT INSTRUCTIONS
El embarazo de la semana 31 a la 29   CUIDADO AMBULATORIO:   Cambios que están ocurriendo en martínez cuerpo: Es posible que usted continúe teniendo síntomas tales mayelin falta de [place], [place], contracciones o inflamación en terrence tobillos y pies  Usted podría estar aumentando 1 brady por semana ahora  Busque atención médica de inmediato si:  · Usted presenta un guadalupe dolor de jorge que no desaparece  · Usted tiene cambios en la visión nuevos o en aumento, mayelin visión borrosa o con manchas  · Usted tiene inflamación nueva o creciente en martínez vita o michelle  · Usted tiene manchado o sangrado vaginal     · Usted rompe sai o siente un chorro o gotas de agua tibia que le está bajando por martínez vagina  Llame a martínez obstetra si:  · Usted tiene más de 5 contracciones en 1 hora  · Usted nota algún cambio en los movimientos de martínez bebé  · Usted tiene calambres, presión o tensión abdominal     · Usted tiene un cambio en la secreción vaginal     · Usted tiene escalofríos o fiebre  · Usted tiene comezón, ardor o dolor vaginal     · Usted tiene cristian secreción vaginal amarillenta, verdosa, janine o de [employer]  · Usted tiene dolor o ardor al Edda Lown, orina menos de lo habitual o tiene [place] rosada o sanguinolenta  · Usted tiene preguntas o inquietudes acerca de martínez condición o cuidado  Cómo cuidarse en esta etapa de martínez embarazo:      · Consuma alimentos saludables y variados  Alimentos saludables incluyen frutas, verduras, panes de shey integral, alimentos lácteos bajos en grasa, frijoles, connie magras y pescado  [place] líquidos mayelin se le haya indicado  Pregunte cuánto líquido debe tiffanie cada día y cuáles líquidos son los más adecuados para usted  Limite el consumo de cafeína a menos de [place] [address]  Limite el consumo de pescado a 2 porciones cada semana  Escoja pescado con concentraciones bajas de nancy mayelin atún al natural enlatado, camarón, salmón, bacalao o tilapia   No coma pescado con concentraciones altas de nancy mayelin pez chasidy, caballa gigante, pargo rayado y tiburón  · Controle la acidez comiendo 4 o 5 comidas pequeñas cada día en vez de comidas grandes  Evite los alimentos picantes  · Controle la inflamación al [place] y poner los pies en alto o elevados sobre [place]  · [id number]  Martínez necesidad de ciertas vitaminas y [facility], mayelin el ácido fólico, aumenta whitley el [facility]  Las vitaminas prenatales proporcionan algunas de las vitaminas y minerales adicionales que usted necesita  Las vitaminas prenatales también podrían ayudar a disminuir el riesgo de ciertos defectos de nacimiento  · Consulte con martínez médico acerca de hacer ejercicio  El ejercicio moderado puede ayudarla a mantenerse en forma  Martínez médico la ayudará a planear un programa de ejercicios que sea seguro para usted whitley martínez [facility]  · No fume  Fumar aumenta el riesgo de aborto espontáneo y otros problemas de su whitley martínez [facility]  Fumar puede causar que martínez bebé nazca antes de tiempo o que pese menos al nacer  Solicite información a martínez médico si usted necesita ayuda para dejar de fumar  · No consuma alcohol  El alcohol pasa de martínez cuerpo al bebé a través de la placenta  Puede afectar el desarrollo del cerebro de martínez bebé y provocar el síndrome de alcoholismo fetal (SAF)  El SAF es un lauren de condiciones que causan problemas [place], de comportamiento y de crecimiento  · Consulte con martínez médico antes de tiffanie cualquier medicamento  Muchos medicamentos pueden perjudicar a martínez bebé si usted los pilar [address] [place] [address]  No tome ningún medicamento, vitaminas, hierbas o suplementos sin kosta consultar con martínez Lyle Sing  use drogas ilegales o de la mckeon (mayelin marihuana o cocaína) mientras está embarazada  Consejos de seguridad whitley el embarazo:  · Evite jacuzzis y saunas   No use un jacuzzi o un sauna mientras usted está embarazada, especialmente whitley el primer trimestre  Los [employer] y los saunas aumentan la temperatura de martínez bebé y el riesgo de defectos de nacimiento  · Evite la toxoplasmosis  [place] es cristian infección causada por comer carne cruda o estar cerca del excremento de un lisa infectado  [place] puede causar malformaciones congénitas, aborto espontáneo y Tadeo Schein  Lávese las michelle después de tocar carne cruda  Asegúrese de que la carne esté amadeo cocida antes de comerla  Evite los huevos crudos y la Kobe Juan  Use guantes o pida que alguien la ayude a limpiar la caja de arena del lisa mientras usted Patsi Blades  Cambios que ocurren con martínez bebé: Para las 34 semanas, martínez bebé podría pesar más de [address]  Martínez bebé medirá alrededor de 12 ½ pulgadas de lizette desde el kenneth de la jorge hasta la rabadilla (parte inferior del bebé)  Martínez bebé está aumentando alrededor de ½ brady por semana  Ahora martínez bebé puede abrir y cerrar terrence ojos  Las patadas y movimientos de martínez bebé son más lorenzo en fletcher momento  Lo que necesita saber acerca del cuidado prenatal: Martínez médico le revisará martínez presión arterial y [place]  Es posible que también necesite lo siguiente:  · Un examen de orina también podría realizarse para revisarle el azúcar y la proteína  Estas son señales de diabetes gestacional o de infección  La proteína en martínez orina también podría ser cristian señal de preeclampsia  La preeclampsia es cristian condición que puede desarrollarse whitley la semana 21 o después en martínez embarazo  Esta provoca presión arterial keshia y [employer] con terrence riñones y [place]  · La detección de diabetes gestacional podría realizarse  Martínez médico le puede ordenar la curva de tolerancia a la glucosa (OGTT) de 1 paso o de 2 pasos  ? Examen de tolerancia a la glucosa en 1 paso: A usted le revisarán el nivel de azúcar en la dawna después de que no haya comido por 8 horas (en ayunas)   Luego le darán a tiffanie cristian solución de glucosa  Le examinarán el nivel de glucosa nuevamente 1 hora y 2 horas después de terminar la bebida  ? Examen de tolerancia a la glucosa en 2 paso: Usted no tiene que ayunar para la primera parte del examen  Usted se tomará la bebida de glucosa a cualquier hora del día  A usted le revisarán el nivel de azúcar en la dawna al cabo de 1 hora  En isela que el nivel de azúcar esté más alto que cierto nivel, le ordenarán otro examen  Usted tendrá que ayunar para que le revisen el azúcar en martínez dawna  Usted se tomará la bebida de glucosa  Le examinarán la dawna de nuevo 1 [place], 2 horas y 3 horas después de terminarse la bebida de glucosa  · La vacuna Tdap podría ser recomendado por martínez médico     · La altura uterina es cristian medición del útero para controlar el desarrollo de martínez bebé  [employer] por lo general es igual al número de [facility] que usted tiene de embarazo  Martínez médico también podría revisar la posición de martínez bebé  · El ritmo cardíaco de martínez bebé será revisado  Programe cristian mitul con martínez obstétrico mayelin se le indique: Anote terrence preguntas para que se acuerde de hacerlas whitley terrence visitas  © Copyright [employer] 2022 Information is for End User's use only and may not be sold, redistributed or otherwise used for commercial purposes  All illustrations and images included in CareNotes® are the copyrighted property of [employer]  or [address] [place] [address] es sólo para uso en educación  Martínez intención no es darle un consejo médico sobre enfermedades o tratamientos  Colsulte con martínez Elise Horde farmacéutico antes de seguir cualquier régimen médico para saber si es seguro y efectivo para usted  Movimiento fetal   LO QUE NECESITA SABER:   Los movimientos fetales son las patadas, vueltas e hipo del bebé en el [place]  Es posible que usted empiece a sentir estos movimientos aproximadamente a las [address]   A medida que martínez bebé crezca, los movimientos se sienten más lorenzo y son Do She[place] frecuentes  Los movimientos del feto comprueban que martínez bebé en el útero está recibiendo el oxígeno y los nutrientes necesarios antes de nacer  La reducción de movimientos fetales podría señalar un problema de la su de martínez bebé  INSTRUCCIONES SOBRE EL SHAZIA HOSPITALARIA:   Ginger Ignacio a terrence consultas de control con martínez médico u obstetra según le indicaron: Anote terrence preguntas para que se acuerde de hacerlas whitley terrence visitas  Movimientos normales del feto: La actividad del ivone en el útero puede describirse en 4 estados, del menos activo al [place]  Whitley el estado de sueño pasivo, es probable que martínez berna por nacer permanezca quieto un benedicto de 2 horas  Whitley el estado de sueño [place], martínez bebé patalea, da vueltas y se mueve con frecuencia  Whitley el estado vigilia tranquila, martínez berna podría solamente  terrence ojos  El estado despierto activo incluye patadas lorenzo y vueltas  Lo que afecta el movimiento fetal: Es posible que sienta a martínez bebé moverse más después de que usted haya comido o bebido cafeína  Es probable que usted sienta menos movimientos de martínez bebé en el útero cuando está más [place], mayelin cuando hace ejercicio  También podría sentir menos movimientos del feto si usted es [place]a [place]  Ciertos medicamentos pueden afectar los movimientos de martínez bebé  Infórmele a martínez médico los medicamentos que pilar  Monitoreo de los movimientos del bebé en la casa: La mayoría de los movimientos de martínez bebé en el útero se notan cuando usted se acuesta silenciosamente de lado  Es probable que martínez médico le pida que cuente los movimientos del feto whitley 2 horas  Podría pedirle que registre el tiempo que martínez bebé dura para realizar 10 movimientos  Mantenga un registro de los movimientos de martínez bebé  Comuníquese con martínez médico u obstetra si:  · Tarda más de lo usual para sentir 10 movimientos de martínez bebé en el útero  · Usted no siente a martínez bebé moverse en el útero por lo menos 10 veces cada 2 horas      · La piel de terrence michelle, pies y alrededor de terrence ojos se encuentra más inflamada de lo normal     · Usted tiene dolor de jorge whitley por lo menos 24 horas  · Le aparecen puntos rojos pequeñitos en la piel  · Martínez estómago se siente sensible al aplicarle presión  · Usted tiene preguntas o inquietudes acerca de martínze condición o cuidado  Regrese a la manuel de emergencias si:  · Usted no siente a martínez bebé en el útero moverse por 12 horas  · Usted siente calambres o dolor jann en el abdomen  · Usted tiene sangrado vaginal abundante  · Usted tiene dolor de [place] severo y no puede arnaldo con claridad  · Usted tiene dificultad para respirar o está vomitando  · Usted sufre cristian convulsión  © Copyright [employer] 2022 Information is for End User's use only and may not be sold, redistributed or otherwise used for commercial purposes  All illustrations and images included in CareNotes® are the copyrighted property of [employer]  or [address] [place] [address] es sólo para uso en educación  Martínez intención no es darle un consejo médico sobre enfermedades o tratamientos  Colsulte con martínez Natan Older farmacéutico antes de seguir cualquier régimen médico para saber si es seguro y efectivo para usted  Medroxiprogesterona (Por inyección)   Medroxyprogesterone (yw-edps-pe-proe-DESTINY-ter-one)  Monique un embarazo  También sirve para tratar la endometriosis y se [place] con otros medicamentos para ayudar a [employer] de cáncer, incluyendo el cáncer de Sharon Mena o de [place]  Heidi(s) : Depo-Provera, Depo-Provera Contraceptive, Depo-SubQ Provera 104, medroxyPROGESTERone acetate Novaplus   Existen muchas otras marcas de Enigmatec  Lizzy medicamento no debe ser usado cuando:   Lizzy medicamento no es adecuado para todas las personas   No lo reciba si usted ha tenido cristian reacción alérgica a la medroxiprogesterona o si usted tiene antecedentes de cáncer de seno o coágulos de dawna (incluyendo ataque al corazón o un derrame cerebral)  En la [employer], no debe usar lizzy medicamento whitley el [facility]  Forma de usar lizzy medicamento:   Inyectable  · Hugo Carrillo enfermera u otro médico le administrará lizzy medicamento  Lizzy medicamento se inyecta en un músculo (en general, las nalgas o la parte superior del brazo) o debajo de la piel  · Martínze horario exacto del tratamiento depende de la razón por la cual usted está usando lizzy medicamento  Martínez médico le explicará martínez horario personal    ? Para tratamiento de los síntomas de cáncer, usted puede comenzar con cristian inyección cristian vez por semana  Es posible que necesite menos inyecciones a medida que avance martínez tratamiento  ? Para control de natalidad o endometriosis, usted necesitará cristian inyección cada 3 meses (13 semanas)  ? Puede que necesite recibir martínez primera inyección whitley los primeros 5 días de martínez periodo menstrual normal, para asegurarse de que no está embarazada  Si usted acaba de tener un bebé, puede recibir cristian inyección 5 días después del parto si no está dando de lactar o 6 semanas después del parto si está dando de lactar  · [place] y siga las instrucciones para el paciente que vienen con el medicamento  Hable con martínez médico o farmacéutico si tiene alguna pregunta  · Si olvida cristian dosis: Debe recibir cristian inyección cada 3 meses si usted quiere evitar el embarazo  Hable con martínez médico o farmacéutico si usted no recibe martínez medicamento a tiempo, porque usted puede necesitar otra forma de control de natalidad  Medicamentos y [employer] que debe evitar:   Consulte con martínez médico o farmacéutico antes de usar cualquier medicamento, incluyendo los que compra sin receta médica, las vitaminas y los productos herbales  · Algunos medicamentos pueden afectar el funcionamiento de la medroxiprogesterona   Informe a martínez médico si está usando cualquiera de los siguientes:  ? Aminoglutetimida, bosentán, carbamazepina, felbamato, griseofulvina, mitotano, modafinilo, nefazodona, oxcarbazepina, fenobarbital, fenitoína, rifabutina, rifampicina, rifapentina, [address], topiramato  ? Medicamento para el tratamiento de cristian infección (incluyendo claritromicina, itraconazol, ketoconazol, telitromicina, voriconazol)  ? Medicamentos para tratar el VIH/SIDA (incluyendo atazanavir, indinavir, nelfinavir, ritonavir, saquinavir)  Precauciones whitley el uso de fletcher medicamento:   · Informe a martínez médico de inmediato si usted stefanie que [place] Pacer  · Informe a martínez médico si usted está amamantando o si tiene enfermedad renal, enfermedad hepática, asma, diabetes, enfermedades cardíaca, convulsiones, alexandro de jorge por migraña, trastorno alimenticio, osteoporosis o antecedentes de depresión  Dígale a martínez médico si usted fuma  · [place] medicamento puede causar los siguientes problemas:  ? Huesos débiles o delgados, especialmente con el uso a lizette plazo  ? Coágulos de dawna, lo que podría llevar a un derrame cerebral, ataque al corazón, problemas con los ojos o la visión u otros problemas graves  ? Posible aumento del riesgo de cáncer de seno  ? Reacciones en el área de la inyección  ? Problemas hepáticos  ? Cambios en los periodos menstruales  ? Retención de líquidos (edema) y aumento de [place]  · Usted no debe usar fletcher medicamento mayelin método anticonceptivo a lizette plazo a menos que usted no pueda usar ningún otro método anticonceptivo  · [place] medicamento no la protegerá contra el VIH / Brad Alberto u otras enfermedades de transmisión sexual   · Dígale a todo médico o dentista encargado de atenderle que usted está usando fletcher medicamento  Puede que fletcher medicamento afecte algunos resultados de [facility] [facility]  · El médico solicitará exámenes de laboratorio whitley las citas de rutina para revisar los efectos de [email or web address]  Asista a todas etrrence citas    Efectos secundarios que pueden presentarse whitley el uso de fletcher medicamento:   Consulte inmediatamente con el médico si nota cualquiera de [place] efectos secundarios:  · Reacción alérgica: Comezón o ronchas, hinchazón del delmar o las michelle, hinchazón u hormigueo en la boca o garganta, opresión en el pecho, dificultad para respirar  · Dolor en el pecho, dificultad para respirar, o toser dawna  · [employer] o heces pálidas, náuseas, vómitos, falta de apetito, dolor estomacal, coloración amarillenta en la piel u ojos  · Sangrado vaginal abundante o que no para  · Perdida de la visión o visión doble  · Adormecimiento o debilidad en un lado del cuerpo, dolor de jorge repentino o severo, problemas con el habla, la visión o para caminar  · Aumento rápido de peso, inflamación en terrence michelle, tobillos, o pies  · Convulsiones  Consulte con el médico si nota los siguientes efectos secundarios menos graves:   · Períodos mensuales ligeros o falta del periodo, manchado entre períodos  · Nerviosismo o mareos  · Dolor, enrojecimiento, ardor, inflamación, o un bulto bajo la piel donde se aplicó la inyección  Consulte con el médico si nota otros efectos secundarios que stefanie son causados por fletcher medicamento  Llame a martínez médico para consultarle Kimberley Henry puede notificar terrence efectos secundarios al [facility] al [phone]  © Copyright [employer] 2022 Information is for End User's use only and may not be sold, redistributed or otherwise used for commercial purposes  Esta información es sólo para uso en educación  Martínez intención no es darle un consejo médico sobre enfermedades o tratamientos  Colsulte con martínez Sonia Vinsons farmacéutico antes de seguir cualquier régimen médico para saber si es seguro y efectivo para usted

## 2022-04-15 ENCOUNTER — ULTRASOUND (OUTPATIENT)
Dept: PERINATAL CARE | Facility: OTHER | Age: 32
End: 2022-04-15

## 2022-04-15 VITALS
SYSTOLIC BLOOD PRESSURE: 109 MMHG | DIASTOLIC BLOOD PRESSURE: 70 MMHG | HEART RATE: 94 BPM | WEIGHT: 167.33 LBS | BODY MASS INDEX: 28.72 KG/M2

## 2022-04-15 DIAGNOSIS — Z36.89 ENCOUNTER FOR ULTRASOUND TO CHECK FETAL GROWTH: Primary | ICD-10-CM

## 2022-04-15 DIAGNOSIS — Z36.2 ENCOUNTER FOR FOLLOW-UP ULTRASOUND OF FETAL ANATOMY: ICD-10-CM

## 2022-04-15 DIAGNOSIS — O99.810 ABNORMAL GLUCOSE AFFECTING PREGNANCY: ICD-10-CM

## 2022-04-15 PROCEDURE — 76816 OB US FOLLOW-UP PER FETUS: CPT | Performed by: OBSTETRICS & GYNECOLOGY

## 2022-04-15 NOTE — PROGRESS NOTES
[address]: Ms Ivan German was seen today for fetal growth and followup missed anatomy ultrasound  See ultrasound report under "OB Procedures" tab  Results conveyed by phone using [employer] #[id number]  Please don't hesitate to contact our office with any concerns or questions    Marissa Flores MD

## 2022-04-25 ENCOUNTER — APPOINTMENT (OUTPATIENT)
Dept: LAB | Facility: HOSPITAL | Age: 32
End: 2022-04-25

## 2022-04-25 DIAGNOSIS — Z34.92 SECOND TRIMESTER PREGNANCY: ICD-10-CM

## 2022-04-25 DIAGNOSIS — O99.810 ABNORMAL GLUCOSE TOLERANCE TEST (GTT) DURING PREGNANCY, ANTEPARTUM: ICD-10-CM

## 2022-04-25 LAB
GLUCOSE 1H P 100 G GLC PO SERPL-MCNC: 154 MG/DL (ref 70–183)
GLUCOSE 2H P 100 G GLC PO SERPL-MCNC: 129 MG/DL (ref 70–155)
GLUCOSE 3H P 100 G GLC PO SERPL-MCNC: 114 MG/DL (ref 70–140)
GLUCOSE P FAST SERPL-MCNC: 94 MG/DL (ref 70–105)

## 2022-04-25 PROCEDURE — 36415 COLL VENOUS BLD VENIPUNCTURE: CPT

## 2022-04-25 PROCEDURE — 82952 GTT-ADDED SAMPLES: CPT

## 2022-04-25 PROCEDURE — 82951 GLUCOSE TOLERANCE TEST (GTT): CPT

## 2022-04-26 ENCOUNTER — TELEPHONE (OUTPATIENT)
Dept: OBGYN CLINIC | Facility: CLINIC | Age: 32
End: 2022-04-26

## 2022-04-29 ENCOUNTER — ROUTINE PRENATAL (OUTPATIENT)
Dept: OBGYN CLINIC | Facility: CLINIC | Age: 32
End: 2022-04-29

## 2022-04-29 VITALS
HEART RATE: 102 BPM | SYSTOLIC BLOOD PRESSURE: 107 MMHG | BODY MASS INDEX: 28.51 KG/M2 | HEIGHT: 64 IN | WEIGHT: 167 LBS | DIASTOLIC BLOOD PRESSURE: 67 MMHG

## 2022-04-29 DIAGNOSIS — N89.8 VAGINAL DISCHARGE DURING PREGNANCY IN THIRD TRIMESTER: ICD-10-CM

## 2022-04-29 DIAGNOSIS — N76.0 BV (BACTERIAL VAGINOSIS): ICD-10-CM

## 2022-04-29 DIAGNOSIS — B96.89 BV (BACTERIAL VAGINOSIS): ICD-10-CM

## 2022-04-29 DIAGNOSIS — Z3A.33 33 WEEKS GESTATION OF PREGNANCY: Primary | ICD-10-CM

## 2022-04-29 DIAGNOSIS — O26.893 VAGINAL DISCHARGE DURING PREGNANCY IN THIRD TRIMESTER: ICD-10-CM

## 2022-04-29 DIAGNOSIS — B37.3 VULVOVAGINAL CANDIDIASIS: ICD-10-CM

## 2022-04-29 DIAGNOSIS — R82.71 GBS BACTERIURIA: ICD-10-CM

## 2022-04-29 LAB
CLUE CELLS SPEC QL WET PREP: ABNORMAL
SL AMB POCT WET MOUNT: ABNORMAL
T VAGINALIS VAG QL WET PREP: ABNORMAL
YEAST VAG QL WET PREP: ABNORMAL

## 2022-04-29 PROCEDURE — 99213 OFFICE O/P EST LOW 20 MIN: CPT | Performed by: OBSTETRICS & GYNECOLOGY

## 2022-04-29 PROCEDURE — 87210 SMEAR WET MOUNT SALINE/INK: CPT | Performed by: OBSTETRICS & GYNECOLOGY

## 2022-04-29 RX ORDER — FLUCONAZOLE 150 MG/1
150 TABLET ORAL
Qty: 3 TABLET | Refills: 0 | Status: SHIPPED | OUTPATIENT
Start: 2022-04-29 | End: 2022-05-06

## 2022-04-29 RX ORDER — METRONIDAZOLE 500 MG/1
500 TABLET ORAL EVERY 12 HOURS SCHEDULED
Qty: 14 TABLET | Refills: 0 | Status: SHIPPED | OUTPATIENT
Start: 2022-04-29 | End: 2022-05-06

## 2022-04-29 NOTE — PROGRESS NOTES
OB/GYN  PN Visit  Erma Carey  [id number]  4/29/2022  2:40 PM  Dr Manoj Harris MD    S: 36 y o  [id number] 33w4d here for PN visit  OB complaints:  Ctxns: occasionally, about once daily  Leakage: no  Bleeding: no  Fetal movement: yes    She reports vaginal burning with a significant amount of yellow discharge for which she is changing her underwear 3x/day  She says it has an odor but does not smell particularly fishy  She also endorses some lower back pain  O:  Vitals:    04/29/22 1309   BP: 107/67   Pulse: 102       Gen: no acute distress, nonlabored breathing  CV: RRR, no m/r/g  Pulm: CTAB, no w/r/r  OB exam completed: abdomen gravid, NT, fundal height 33 cm, FHTs 160 bpm      A/P:  #1  33w4d GESTATION  Vaginal birth planned  Would like implant for contraception following delivery  Normal US on 4/15  Normal 3H GTT  Due for COVID-19 booster  Labor precautions reviewed  Fetal kick counts reviewed  RTC in 2 weeks    #2  BV: metronidazole 500 mg BID for 7 days    #3   Vaginal candidiasis: fluconazole 150 mg q72h x3 doses    #4 GBS bacteriuria: intrapartum antibiotics    Future Appointments   Date Time Provider Talon Franks   5/12/2022 07:76 PM Pearl Daniels MD [facility] & [address] [facility]         Manoj Harris MD  4/29/2022  2:40 PM

## 2022-04-29 NOTE — PATIENT INSTRUCTIONS
Vaginosis bacteriana   CUIDADO AMBULATORIO:   La vaginosis bacteriana es cristian infección en la vagina  Puede causar vaginitis (irritación e inflamación de la vagina)  La causa es desconocida  Las bacterias que normalmente se encuentran en la vagina están desequilibradas  El riesgo aumenta si usted es sexualmente Dobrovo v Brdih, [place] ducha vaginal o tiene un dispositivo intrauterino (DIU)  Signos y síntomas comunes de la vaginosis bacteriana:  · Flujo vaginal de color mejia, baylee o amarillo    · Flujo vaginal con un desagradable olor a pescado    · Picazón o ardor en el área circundante afuera de martínez vagina    Llame a martínez médico o ginecólogo si:  · Kim síntomas regresan o no mejoran con el tratamiento  · Usted tiene sangrado vaginal que no es de martínez [place]  · Usted tiene preguntas o inquietudes acerca de martínez condición o cuidado  Los antibióticos pueden administrarse para matar las bacterias  Se pueden recetar en forma de píldora o crema para introducir en martínez vagina  Vaginosis bacteriana y embarazo: Si tiene vaginosis bacteriana whitley el FTL SOLAR, es posible que martínez bebé nazca antes de tiempo o que tenga un peso bajo al nacer  Es posible que martínez proveedor de atención médica le recomiende que se mayito un análisis para detectar la vaginosis bacteriana antes o whitley el FTL SOLAR  Waunita Alex sobre el riesgo de un parto prematuro y se asegurará de que conozca los beneficios y riesgos de las pruebas  Prevenir la vaginosis bacteriana:  · Mantenga el área vaginal limpia y [place]  Lleve ropa interior y pantimedias hechas de algodón en el área de la entrepierna  Límpiese de adelante hacia atrás después de orinar o de tener cristian evacuación intestinal  Después de bañarse, enjuague el jabón de la [place] vaginal para disminuir el riesgo de irritación  · No utilice productos que provocan irritación  Siempre use tampones y toallas sanitarias sin aroma  No use aerosoles para higiene femenina, talcos o tampones perfumados  También podrían provocar Irritación y aumentar martínez riesgo de vaginosis  Los detergentes y suavizantes para la ropa también pueden causar irritación  · No use duchas vaginales  Estas pueden provocar un desequilibrio de la bacteria saludable de la vagina  · Use condones de látex whitley las [employer]  Estos ayudan a prevenir contra otras infecciones y marlon que martínez rosetta contraiga la infección  Acuda a terrence consultas de control con martínez médico o ginecólogo según le indicaron: La vaginosis bacteriana aumenta el riesgo de varios problemas de [place], mayelin la enfermedad inflamatoria pélvica (EIP) o las infecciones de transmisión sexual  Cam Zenaida con terrence médicos para programar citas regulares para detectar problemas de su  Anote terrence preguntas para que se acuerde de hacerlas whitley terrence visitas  © Copyright [employer] 2022 Information is for End User's use only and may not be sold, redistributed or otherwise used for commercial purposes  All illustrations and images included in CareNotes® are the copyrighted property of [employer]  or [address] [place] [address] Avenue es sólo para uso en educación  Martínez intención no es darle un consejo médico sobre enfermedades o tratamientos  Colsulte con martínez [facility] farmacéutico antes de seguir cualquier régimen médico para saber si es seguro y efectivo para usted  Candidiasis (infección por hongos)   CUIDADO AMBULATORIO:   La infección por hongos , o candidiasis vaginal, es cristian infección vaginal común  La infección por hongos es causada por un hongo o microorganismo levaduriforme  Los hongos se encuentran normalmente en la vagina  Muchos hongos pueden causar Pitney Jeremiah    Los signos y síntomas comunes son:  · Flujo espeso, mejia y con apariencia de queso que proviene de la vagina    · Picazón, inflamación o enrojecimiento en la vagina    · Dolor o ardor al orinar    · [employer] las relaciones sexuales    Llame a martínez médico o ginecólogo si:  · Usted tiene fiebre y [address]  · Usted desarrolla un dolor abdominal o pélvico     · La secreción contiene dawna y no es de la menstruación  · Kim signos y [address], [address]  · Usted tiene preguntas o inquietudes acerca de martínez condición o cuidado  El tratamiento para la candidiasis incluye medicamentos para tratar la infección por hongos y disminuir la inflamación  El [place] puede venir en presentación de píldora, crema, ungüento, comprimido o supositorio para la vagina  Mantenga martínez vagina saludable:  · Limpie alrededor del área genital con agua tibia y un jabón suave todos los GRASSE  No coloque jabón dentro de la vagina  Después de lavada, séquela suavemente  No use jacuzzis  El calor y la humedad de los jacuzzis pueden aumentar el riesgo de otra candidiasis  · Límpiese siempre de adelante hacia atrás después de usar el inodoro  Lacon marlon la diseminación de bacterias desde el área rectal hacia la vagina  · No use ropa ni lencería apretada whitley largos períodos  Que use ropa interior de [employer]  El algodón ayuda a mantener el área genital seca y no mantiene el calor o la humedad  No use ninguna ropa interior por las noches  · No tome duchas vaginales ni use aerosoles de higiene femenina o hugo de burbujas  No utilice almohadillas o tampones que son perfumados o de colores ni papel higiénico perfumado  · No tenga relaciones sexuales hasta que kim síntomas hayan desaparecido  Zainab que martínez rosetta use un preservativo hasta que usted complete el [place]  · Consulte con martínez médico acerca de las opciones de anticonceptivos, si es necesario  Los condones de látex y los diafragmas tienen un gel que [employer] espermatozoides  Ambos pueden irritar el área genital     Micha Campi a kim consultas de control con martínez médico o ginecólogo según le indicaron: Anote kim preguntas para que se acuerde de hacerlas whitley kim visitas    © Copyright [employer] 2022 Information is for [employer] use only and may not be sold, redistributed or otherwise used for commercial purposes  All illustrations and images included in CareNotes® are the copyrighted property of A D A M , Inc  or [address] [place] [address] es sólo para uso en educación  Martínez intención no es darle un consejo médico sobre enfermedades o tratamientos  Colsulte con martínez Jovan Muller farmacéutico antes de seguir cualquier régimen médico para saber si es seguro y efectivo para usted

## 2022-05-12 PROBLEM — Z3A.35 35 WEEKS GESTATION OF PREGNANCY: Chronic | Status: ACTIVE | Noted: 2022-01-06

## 2022-05-13 ENCOUNTER — ROUTINE PRENATAL (OUTPATIENT)
Dept: OBGYN CLINIC | Facility: CLINIC | Age: 32
End: 2022-05-13

## 2022-05-13 VITALS — SYSTOLIC BLOOD PRESSURE: 110 MMHG | WEIGHT: 170 LBS | DIASTOLIC BLOOD PRESSURE: 74 MMHG | BODY MASS INDEX: 29.18 KG/M2

## 2022-05-13 DIAGNOSIS — Z3A.35 35 WEEKS GESTATION OF PREGNANCY: ICD-10-CM

## 2022-05-13 DIAGNOSIS — Z34.93 PRENATAL CARE IN THIRD TRIMESTER: Primary | ICD-10-CM

## 2022-05-13 PROCEDURE — 99213 OFFICE O/P EST LOW 20 MIN: CPT | Performed by: OBSTETRICS & GYNECOLOGY

## 2022-05-13 NOTE — PATIENT INSTRUCTIONS
El embarazo de la semana 35 a la 38   LO QUE NECESITA SABER:   ¿Qué cambios están ocurriendo en mi cuerpo? Al principio de las 37 semanas se considera que usted está en término completo  Podría ser más fácil que usted respire si martínez bebé se ha posicionado con la jorge hacia abajo  Es posible que usted necesite orinar con mayor frecuencia ya que el bebé podría estar presionando martínez vejiga  Usted también podría sentir más molestias y cansarse fácilmente  ¿Cómo me vahe cuidar en esta etapa de mi embarazo? Consuma alimentos saludables y variados  Alimentos saludables incluyen frutas, verduras, panes de shey integral, alimentos lácteos bajos en grasa, frijoles, connie magras y pescado  Trilla líquidos mayelin se le haya indicado  Pregunte cuánto líquido debe tiffanie cada día y cuáles líquidos son los más adecuados para usted  Limite el consumo de cafeína a menos de [address]  Limite el consumo de pescado a 2 porciones cada semana  Escoja pescado con concentraciones bajas de nancy mayelin atún al natural enlatado, camarón, salmón, bacalao o tilapia  No coma pescado con concentraciones altas de nancy mayelin pez chasidy, caballa gigante, pargo rayado y tiburón  [address] [place]  Martínez necesidad de ciertas vitaminas y [place], mayelin el ácido fólico, aumenta whitley el [facility]  Las vitaminas prenatales proporcionan algunas de las vitaminas y minerales adicionales que usted necesita  Las vitaminas prenatales también podrían ayudar a disminuir el riesgo de ciertos defectos de nacimiento  Descanse tanto mayelin sea necesario  Levante terrence pies si tiene inflamación en terrence tobillos y pies  Consulte con martínez médico acerca de hacer ejercicio  El ejercicio moderado puede ayudarla a mantenerse en forma  Martínez médico la ayudará a planear un programa de ejercicios que sea seguro para usted whitley martínez [place]  No fume   Fumar aumenta el riesgo de aborto espontáneo y [de-identified] problemas de su whitley martínez Darlen Halter  Fumar puede causar que martínez bebé nazca antes de tiempo o que pese menos al nacer  Solicite información a martínez médico si usted necesita ayuda para dejar de fumar  No consuma alcohol  El alcohol pasa de martínez cuerpo al bebé a través de la placenta  Puede afectar el desarrollo del cerebro de martínez bebé y provocar el síndrome de alcoholismo fetal (SAF)  El SAF es un lauren de condiciones que causan problemas [place], de comportamiento y de crecimiento  Consulte con martínez médico antes de tiffanie cualquier medicamento  Muchos medicamentos pueden perjudicar a martínez bebé si usted los pilar [address]  No tome ningún medicamento, vitaminas, hierbas o suplementos sin kosta consultar con martínez Franky Cage  use drogas ilegales o de la mckeon (mayelin marihuana o cocaína) mientras está embarazada  ¿Cuáles son [place] Kamari consejos de seguridad whitley el embarazo? Evite jacuzzis y saunas  No use un jacuzzi o un sauna mientras usted está embarazada, especialmente whitley el primer trimestre  Los [facility] y los saunas aumentan la temperatura de martínez bebé y el riesgo de defectos de nacimiento  Evite la toxoplasmosis  [place] es cristian infección causada por comer carne cruda o estar cerca del excremento de un lisa infectado  [place] puede causar malformaciones congénitas, aborto espontáneo y Tadeo Daviesese las michelle después de tocar carne cruda  Asegúrese de que la carne esté amadeo cocida antes de comerla  Evite los huevos crudos y la Leonides Carbo  Use guantes o pida que alguien la ayude a limpiar la caja de arena del lisa mientras usted [place]  Consulte con martínez médico acerca de viajar  El [address] cómodo para viajar es whitley el mamie trimestre  Pregunte a martínez médico si puede viajar después de las 36 semanas  Es posible que no pueda viajar en avión después de las 36 [facility]  También le puede recomendar que evite los viajes largos por carretera      ¿Qué cambios están ocurriendo con mi bebé? Para las 38 semanas, martínez bebé podría pesar entre 6 y [address]  Martínez bebé podría medir alrededor de 14 pulgadas de lizette desde la punta de la jorge hasta la rabadilla (parte inferior del bebé)  Martínez bebé escucha lo suficientemente amadeo mayelin para reconocer martínez voz  Conforme martínez bebé crece más, usted podría sentir menos patadas y sentir más que martínez bebé se estira y [place]  Martínez bebé podría moverse en posición con la jorge hacia abajo  Martínez bebé también descansará en la parte inferior de martínez abdomen  ¿Qué necesito saber acerca del cuidado prenatal? Martínez médico le revisará martínez presión arterial y [place]  Es posible que también necesite lo siguiente:  Un examen de orina también podría realizarse para revisarle el azúcar y la proteína  Estas son señales de diabetes gestacional o de infección  La proteína en martínez orina también podría ser cristian señal de preeclampsia  La preeclampsia es cristian condición que puede desarrollarse whitley la semana 21 o después en martínez embarazo  Esta provoca presión arterial keshia y [employer] con terrence riñones y [place]  La detección de diabetes gestacional podría realizarse  Martínez médico le puede ordenar la curva de tolerancia a la glucosa (OGTT) de 1 paso o de 2 pasos  Examen de tolerancia a la glucosa en 1 paso: A usted le revisarán el nivel de azúcar en la dawna después de que no haya comido por 8 horas (en ayunas)  Luego le darán a tiffanie cristian solución de glucosa  Le examinarán el nivel de glucosa nuevamente 1 hora y 2 horas después de terminar la bebida  Examen de tolerancia a la glucosa en 2 paso: Usted no tiene que ayunar para la primera parte del examen  Usted se tomará la bebida de glucosa a cualquier hora del día  A usted le revisarán el nivel de azúcar en la dawna al cabo de 1 hora  En isela que el nivel de azúcar esté más alto que cierto nivel, le ordenarán otro examen  Usted tendrá que ayunar para que le revisen el azúcar en martínez dawna   Usted se tomará la bebida de glucosa  Le examinarán la dawna de nuevo [address] [place], 2 horas y 3 horas después de terminarse la bebida de glucosa  Los análisis de dawna se pueden realizar para revisar si tiene signos de anemia (nivel bajo del jeremy)  La vacuna Tdap podría ser recomendado por martínez médico     El examen del estreptococo del lauren B es un examen que se realiza para verificar si hay infección por estreptococos del lauren B  El estreptococo del lauren B es un tipo de bacteria que se puede encontrar en la vagina o el recto  Podría ser transmitida a martínez bebé whitley el parto si usted la tiene  Martínez médico podría tiffanie Mas Reichmann de martínez vagina o recto y darrion la Carl[place] Dach al laboratorio para que la examinen  La altura uterina es cristian medición del útero para controlar el desarrollo de martínez bebé  [employer] por lo general es igual al número de [facility] que usted tiene de embarazo  Martínez médico también podría revisar la posición de martínez bebé  El ritmo cardíaco de martínez bebé será revisado  ¿Cuándo vahe buscar atención inmediata? Usted presenta un guadalupe dolor de jorge que no desaparece  Usted tiene [employer] visión nuevos o en aumento, mayelin visión borrosa o con manchas  Usted tiene inflamación nueva o creciente en martínez vita o michelle  Usted tiene manchado o sangrado vaginal     Usted rompe sai o siente un chorro o gotas de agua tibia que le está bajando por martínez vagina  ¿Cuándo vahe llamar a mi obstetra? Usted tiene más de 5 contracciones en 1 hora  Usted nota algún [employer] movimientos de martínez bebé  Usted tiene calambres, presión o tensión abdominal     Usted tiene un cambio en la secreción vaginal     Usted tiene escalofríos o fiebre  Usted tiene comezón, ardor o dolor vaginal     Usted tiene cristian secreción vaginal amarillenta, verdosa, janine o de [employer]  Usted tiene dolor o ardor al [facility] menos de lo habitual o tiene orina rosada o sanguinolenta      Usted tiene preguntas o inquietudes acerca de martínez condición o cuidado  ACUERDOS SOBRE MARTÍNEZ CUIDADO:   Usted tiene el derecho de ayudar a planear martínez cuidado  Aprenda todo lo que pueda sobre martínez condición y mayelin darle tratamiento  Discuta terrence opciones de tratamiento con terrence médicos para decidir el cuidado que usted desea recibir  Usted siempre tiene el derecho de rechazar el tratamiento  Esta información es sólo para uso en educación  Martínez intención no es darle un consejo médico sobre enfermedades o tratamientos  Colsulte con martínez Lurdes Dinorah farmacéutico antes de seguir cualquier régimen médico para saber si es seguro y efectivo para usted  © Copyright [facility] 2022 Information is for End User's use only and may not be sold, redistributed or otherwise used for commercial purposes   All illustrations and images included in CareNotes® are the copyrighted property of A D A M , Inc  or [address] [place] [address]

## 2022-05-13 NOTE — PROGRESS NOTES
Alda Wilkerson is a 36 y o  [place] who presents today for routine OB visit at 35w4d  Blood Pressure: 110/74  Wt=77 1 kg (170 lb); Body mass index is 29 18 kg/m²  Abdomen: gravid, soft, non-tender  /vaginal: external inspection watery white discharge visible - internally as well  No overt fluid present, no vaginal bleeding  She reports everything has been going well  Denies uterine contractions  Does report lower pelvic pressure and cramping about 2x per day  Denies vaginal bleeding or leaking of fluid; States she continues to have white watery discharge but is no longer foul smelling or itchy   Reports adequate fetal movement of at least 10 movements in 2 hours once daily  MA assisted with translation, pt agrees to this (pt Salvadorean speaking)     Plan:   Blood Type: O   RH status: Pos  Glucose: Normal 3 hr GTT  Birth Plan: Vaginally; does want PP implant for contraception   GBS status: POSITIVE at 21w; will treat in labor  Normal u/s 4/15  Reviewed premature labor precautions and fetal kick counts  Advised to continue medications and return in 1 weeks  Current Outpatient Medications:     Prenatal Vit-Fe Fumarate-FA (PRENATAL PO), Take by mouth, Disp: , Rfl:     co-enzyme Q-10 30 MG capsule, Take 1 capsule (30 mg total) by mouth daily, Disp: 30 capsule, Rfl: 2    docusate sodium (COLACE) 100 mg capsule, Take 1 capsule (100 mg total) by mouth 2 (two) times a day (Patient not taking: No sig reported), Disp: 60 capsule, Rfl: 6    fluticasone (FLONASE) 50 mcg/act nasal spray, Week 1  administer 2 sprays in each nostril per day  Week 2 administer  1 spray in each nostril daily   (Patient not taking: No sig reported), Disp: 16 g, Rfl: 3    magnesium oxide (MAG-OX) 400 mg, Take 1 tablet (400 mg total) by mouth daily (Patient not taking: No sig reported), Disp: 30 tablet, Rfl: 2      Pregnancy Problems (from 01/06/22 to present)     Problem Noted Resolved    Other abnormal glucose 3/31/2022 by Mauro Hernandez MD No    Overview Signed 3/31/2022 11:43 AM by Mauro Hernandez MD     Abnormal 1h, 3h gtt ordered           Third trimester pregnancy 3/1/2022 by KAREL Hernandez No    Overview Signed 3/31/2022 11:42 AM by Mauro Hernandez MD     Delivery consenet signed 3/31/22  Provided patient with pamphlet regarding different contraception, continue discussion at next visit           GBS bacteriuria 2/7/2022 by KAREL Hernandez No    Overview Signed 2/7/2022  3:52 PM by KAREL Hernandez     Treat in labor           35 weeks gestation of pregnancy 9/9/7251 by Nuzhat Mariee MD No

## 2022-05-27 ENCOUNTER — ROUTINE PRENATAL (OUTPATIENT)
Dept: OBGYN CLINIC | Facility: CLINIC | Age: 32
End: 2022-05-27

## 2022-05-27 VITALS
WEIGHT: 171 LBS | DIASTOLIC BLOOD PRESSURE: 69 MMHG | HEIGHT: 64 IN | SYSTOLIC BLOOD PRESSURE: 100 MMHG | BODY MASS INDEX: 29.19 KG/M2 | HEART RATE: 98 BPM

## 2022-05-27 DIAGNOSIS — Z3A.37 37 WEEKS GESTATION OF PREGNANCY: ICD-10-CM

## 2022-05-27 DIAGNOSIS — Z34.93 PRENATAL CARE IN THIRD TRIMESTER: Primary | ICD-10-CM

## 2022-05-27 PROCEDURE — 99213 OFFICE O/P EST LOW 20 MIN: CPT | Performed by: OBSTETRICS & GYNECOLOGY

## 2022-05-27 NOTE — PATIENT INSTRUCTIONS
Inducción del [place] de parto   LO QUE NECESITA SABER:   ¿Qué es cristian inducción del [place] de [place]? La inducción del [place] de parto es un procedimiento para inducir (comenzar) martínez trabajo de parto antes de que comience por martínez propia cuenta  Se administran medicamentos para provocar las contracciones y ayudar a ablandar, hacer más rivera y dilatar (abrir) el jose alejandro uterino  Es posible que le administren antibióticos para combatir cirstian infección bacteriana que tenga o para prevenir cristian infección whitley el [place]  ¿Por qué podría necesitar cristian inducción del [place] de [place]? Un problema de su que tenga usted, mayelin presión arterial keshia o diabetes    Un problema de su que tenga martínez bebé, mayelin latidos cardíacos lentos o poco crecimiento dentro del útero    Problemas relacionados con martínez embarazo, mayelin infección del líquido amniótico, ruptura de tala antes de que comience el [place] de parto o poco líquido amniótico    ¿Qué ocurre whitley cristian inducción del [place] de parto? Martínez médico puede usar larua o más de los siguientes métodos para inducir el [place] de parto: Maduración cervical es un proceso que ayuda a ablandar y hacer más pequeño el jose alejandro uterino  Pueden utilizarse medicamentos llamados prostaglandinas para madurar el jose alejandro uterino  Estos medicamentos pueden insertarse en la vagina o tomarse en Eam[place]el [place]  También pueden utilizarse otros métodos para dilatar el jose alejandro uterino  [place] incluye un catéter con un globo inflable en el extremo que se inserta en el jose alejandro uterino  La solución salina se inyecta a través del catéter y [place] al globo a inflarse  Cristian sustancia que absorbe el agua también puede insertarse en el jose alejandro uterino para ayudar a dilatarlo  Desprendimiento de las membranas es un procedimiento que hace que martínez cuerpo libere prostaglandinas naturalmente  Las prostaglandinas ablandan el jose alejandro uterino y pueden ayudar a causar contracciones   Martínez médico introduce [address] que conectan el saco amniótico de la pared del útero y realizar un movimiento de "barrido"  La ruptura del saco amniótico es un procedimiento que se [place] para romper bolsa  Martínez médico usará cristian pequeña herramienta para hacer un agujero en el saco amniótico  [place] puede ayudar a que las contracciones comiencen  Oxitocina puede administrarse por vía intravenosa para causar contracciones lorenzo y regulares  ¿Cuáles son los riesgos de la inducción del [place] de [place]? Los medicamentos utilizados para inducir el trabajo de parto pueden provocar demasiadas contracciones  [place] puede disminuir los latidos del bebé y disminuir martínez suministro de oxígeno  La inducción del [place] de parto también aumenta el riesgo de prolapso del cordón umbilical  Esta afección hace que el cordón se deslice hacia atrás a la vagina antes del parto  Puede comprimir el cordón y [place] suministro de oxígeno de martínez bebé  La inducción médica puede causarle cristian infección a usted o a martínez bebé  La inducción [employer] el riesgo de cristian cesárea, especialmente si es la primera vez que da a jaylan  Martínez útero puede sufrir cristian ruptura si usted ha tendido cristian cesárea previamente  ACUERDOS SOBRE MARTÍNEZ CUIDADO:   Usted tiene el derecho de ayudar a planear martínez cuidado  Aprenda todo lo que pueda sobre martínez condición y mayelin darle tratamiento  Discuta terrence opciones de tratamiento con terrence médicos para decidir el cuidado que usted desea recibir  Usted siempre tiene el derecho de rechazar el tratamiento  Esta información es sólo para uso en educación  Martínez intención no es darle un consejo médico sobre enfermedades o tratamientos  Colsulte con martínez [place] farmacéutico antes de seguir cualquier régimen médico para saber si es seguro y efectivo para usted  © Copyright [facility] 2022 Information is for End User's use only and may not be sold, redistributed or otherwise used for commercial purposes   All illustrations and images included in [facility] are the copyrighted property of A D A M , Inc  or [facility] la semana 35 a la 38   LO QUE NECESITA SABER:   ¿Qué cambios están ocurriendo en mi cuerpo? Al principio de las 37 semanas se considera que usted está en término completo  Podría ser más fácil que usted respire si martínez bebé se ha posicionado con la jorge hacia abajo  Es posible que usted necesite orinar con mayor frecuencia ya que el bebé podría estar presionando martínez vejiga  Usted también podría sentir más molestias y cansarse fácilmente  ¿Cómo me vahe cuidar en esta etapa de mi embarazo? Consuma alimentos saludables y variados  Alimentos saludables incluyen frutas, verduras, panes de shey integral, alimentos lácteos bajos en grasa, frijoles, connie magras y pescado  [place] líquidos mayelin se le haya indicado  Pregunte cuánto líquido debe tiffanie cada día y cuáles líquidos son los más adecuados para usted  Limite el consumo de cafeína a menos de [address]  Limite el consumo de pescado a 2 porciones cada semana  Escoja pescado con concentraciones bajas de nancy mayelin atún al natural enlatado, camarón, salmón, bacalao o tilapia  No coma pescado con concentraciones altas de nancy mayelin pez chasidy, caballa gigante, pargo rayado y tiburón  [address] [place]  Martínez necesidad de ciertas vitaminas y [address], mayelin el ácido fólico, aumenta whitley el [facility]  Las vitaminas prenatales proporcionan algunas de las vitaminas y minerales adicionales que usted necesita  Las vitaminas prenatales también podrían ayudar a disminuir el riesgo de ciertos defectos de nacimiento  Descanse tanto mayelin sea necesario  Levante terrence pies si tiene inflamación en terrence tobillos y pies  Consulte con martínez médico acerca de hacer ejercicio  El ejercicio moderado puede ayudarla a mantenerse en forma   Martínez médico la ayudará a planear un programa de ejercicios que sea seguro para usted whitley martínez embarazo  No fume  Fumar aumenta el riesgo de aborto espontáneo y otros problemas de su whitley martínez Josue Wicho  Fumar puede causar que martínez bebé nazca antes de tiempo o que pese menos al nacer  Solicite información a martínez médico si usted necesita ayuda para dejar de fumar  No consuma alcohol  El alcohol pasa de martínez cuerpo al bebé a través de la placenta  Puede afectar el desarrollo del cerebro de martínez bebé y provocar el síndrome de alcoholismo fetal (SAF)  El SAF es un lauren de condiciones que causan problemas [place], de comportamiento y de crecimiento  Consulte con martínez médico antes de tiffanie cualquier medicamento  Muchos medicamentos pueden perjudicar a martínez bebé si usted los pilar [address]  No tome ningún medicamento, vitaminas, hierbas o suplementos sin kosta consultar con martínez Cem Sciara  use drogas ilegales o de la mckeon (mayelin marihuana o cocaína) mientras está embarazada  ¿Cuáles son [place] consejos de seguridad whitley el embarazo? Evite jacuzzis y saunas  No use un jacuzzi o un sauna mientras usted está embarazada, especialmente whitley el primer trimestre  Los [facility] y los saunas aumentan la temperatura de martínez bebé y el riesgo de defectos de nacimiento  Evite la toxoplasmosis  [place] es cristian infección causada por comer carne cruda o estar cerca del excremento de un lisa infectado  [place] puede causar malformaciones congénitas, aborto espontáneo y Tadeo Schein  Lávese las michelle después de tocar carne cruda  Asegúrese de que la carne esté amadeo cocida antes de comerla  Evite los huevos crudos y la [place]  Use guantes o pida que alguien la ayude a limpiar la caja de arena del lisa mientras usted Verena Gonzalo  Consulte con martínez médico acerca de viajar  El [address] cómodo para viajar es whitley el mamie trimestre  Pregunte a martínez médico si puede viajar después de las 36 semanas   Es posible que no pueda viajar en avión después de las 36 semanas  También le puede recomendar que evite los viajes largos por carretera  ¿Qué cambios están ocurriendo con mi bebé? Para las 38 semanas, martínez bebé podría pesar entre 6 y [address]  Martínez bebé podría medir alrededor de 14 pulgadas de lizette desde la punta de la jorge hasta la rabadilla (parte inferior del bebé)  Martínez bebé escucha lo suficientemente amadeo mayelin para reconocer martínez voz  Conforme martínez bebé crece más, usted podría sentir menos patadas y sentir más que martínez bebé se estira y [place]  Martínez bebé podría moverse en posición con la jorge hacia abajo  Martínez bebé también descansará en la parte inferior de martínez abdomen  ¿Qué necesito saber acerca del cuidado prenatal? Martínez médico le revisará martínez presión arterial y [place]  Es posible que también necesite lo siguiente:  Un examen de orina también podría realizarse para revisarle el azúcar y la proteína  Estas son señales de diabetes gestacional o de infección  La proteína en martínez orina también podría ser cristian señal de preeclampsia  La preeclampsia es cristian condición que puede desarrollarse whitley la semana 21 o después en martínez embarazo  Esta provoca presión arterial keshia y [employer] con terrence riñones y [place]  La detección de diabetes gestacional podría realizarse  Martínez médico le puede ordenar la curva de tolerancia a la glucosa (OGTT) de 1 paso o de 2 pasos  Examen de tolerancia a la glucosa en 1 paso: A usted le revisarán el nivel de azúcar en la dawna después de que no haya comido por 8 horas (en ayunas)  Luego le darán a tiffanie cristian solución de glucosa  Le examinarán el nivel de glucosa nuevamente 1 hora y 2 horas después de terminar la bebida  Examen de tolerancia a la glucosa en 2 paso: Usted no tiene que ayunar para la primera parte del examen  Usted se tomará la bebida de glucosa a cualquier hora del día  A usted le revisarán el nivel de azúcar en la dawna al cabo de 1 hora   En isela que el nivel de azúcar esté más alto que deandra Harper, le ordenarán otro examen  Usted tendrá que ayunar para que le revisen el azúcar en martínez dawna  Usted se tomará la bebida de glucosa  Le examinarán la dawna de nuevo [address] [place], 2 horas y 3 horas después de terminarse la bebida de glucosa  Los análisis de dawna se pueden realizar para revisar si tiene signos de anemia (nivel bajo del jeremy)  La vacuna Tdap podría ser recomendado por martínez médico     El examen del estreptococo del lauren B es un examen que se realiza para verificar si hay infección por estreptococos del lauren B  El estreptococo del lauren B es un tipo de bacteria que se puede encontrar en la vagina o el recto  Podría ser transmitida a martínez bebé whitley el parto si usted la tiene  Martínez médico podría tiffanie [place] de martínez vagina o recto y darrion la Ayad [place] al laboratorio para que la examinen  La altura uterina es cristian medición del útero para controlar el desarrollo de martínez bebé  [employer] por lo general es igual al número de [address] que usted tiene de embarazo  Martínez médico también podría revisar la posición de martínez bebé  El ritmo cardíaco de martínez bebé será revisado  ¿Cuándo vahe buscar atención inmediata? Usted presenta un guadalupe dolor de jorge que no desaparece  Usted tiene [employer] visión nuevos o en aumento, mayelin visión borrosa o con manchas  Usted tiene inflamación nueva o creciente en mratínez vita o michelle  Usted tiene manchado o sangrado vaginal     Usted rompe sai o siente un chorro o gotas de agua tibia que le está bajando por martínez vagina  ¿Cuándo vahe llamar a mi obstetra? Usted tiene más de 5 contracciones en 1 hora  Usted nota algún [employer] movimientos de martínez bebé  Usted tiene calambres, presión o tensión abdominal     Usted tiene un cambio en la secreción vaginal     Usted tiene escalofríos o fiebre  Usted tiene comezón, ardor o dolor vaginal     Usted tiene cristian secreción vaginal amarillenta, verdosa, janine o de [employer]      Usted tiene dolor o ardor al Kate Jay menos de lo habitual o tiene [place] rosada o sanguinolenta  Usted tiene preguntas o inquietudes acerca de martínez condición o cuidado  ACUERDOS SOBRE MARTÍNEZ CUIDADO:   Usted tiene el derecho de ayudar a planear martínez cuidado  Aprenda todo lo que pueda sobre martínez condición y mayelin darle tratamiento  Discuta terrence opciones de tratamiento con terrence médicos para decidir el cuidado que usted desea recibir  Usted siempre tiene el derecho de rechazar el tratamiento  Esta información es sólo para uso en educación  Martínez intención no es darle un consejo médico sobre enfermedades o tratamientos  Colsulte con martínez [place] farmacéutico antes de seguir cualquier régimen médico para saber si es seguro y efectivo para usted  © Copyright [employer] 2022 Information is for End User's use only and may not be sold, redistributed or otherwise used for commercial purposes   All illustrations and images included in CareNotes® are the copyrighted property of A D A M , Inc  or [address] [place] [address]

## 2022-05-27 NOTE — PROGRESS NOTES
Amanda Siemens is a 36 y o  Nathalia Martinezak who presents today for routine OB visit at 37w4d  Blood Pressure: 100/69  Wt=77 6 kg (171 lb); Body mass index is 29 35 kg/m²  Abdomen: gravid, soft, non-tender  She reports no concerns  Continues to have the thin white vaginal discharge on occasion  No LOF  Denies uterine contractions  Denies vaginal bleeding or leaking of fluid  Reports adequate fetal movement of at least 10 movements in 2 hours once daily  Plan:   Blood Type: O   RH status: Pos  Birth Plan: Vaginal , discussed option of induction today and provided information on AVS  Does want PP implant for contraception   Third trimester bloodwork completed  Vaccinations: tdap and covid x2  GBS status: POSITIVE ; will treat in labor  Scheduled for next ultrasound n/a normal 4/15  Reviewed premature labor precautions and fetal kick counts  Advised to continue medications and return in 1 weeks  Current Outpatient Medications:     Prenatal Vit-Fe Fumarate-FA (PRENATAL PO), Take by mouth, Disp: , Rfl:     co-enzyme Q-10 30 MG capsule, Take 1 capsule (30 mg total) by mouth daily, Disp: 30 capsule, Rfl: 2    docusate sodium (COLACE) 100 mg capsule, Take 1 capsule (100 mg total) by mouth 2 (two) times a day (Patient not taking: No sig reported), Disp: 60 capsule, Rfl: 6    fluticasone (FLONASE) 50 mcg/act nasal spray, Week 1  administer 2 sprays in each nostril per day  Week 2 administer  1 spray in each nostril daily   (Patient not taking: No sig reported), Disp: 16 g, Rfl: 3    magnesium oxide (MAG-OX) 400 mg, Take 1 tablet (400 mg total) by mouth daily (Patient not taking: No sig reported), Disp: 30 tablet, Rfl: 2      Pregnancy Problems (from 01/06/22 to present)     Problem Noted Resolved    Other abnormal glucose 3/31/2022 by Emily Soriano MD No    Overview Signed 3/31/2022 11:43 AM by Emily Soriano MD     Abnormal 1h, 3h gtt ordered           Third trimester pregnancy 3/1/2022 by KAREL Walker No    Overview Signed 3/31/2022 11:42 AM by Rafael Burk MD     Delivery consenet signed 3/31/22  Provided patient with pamphlet regarding different contraception, continue discussion at next visit           GBS bacteriuria 2/7/2022 by KAREL Walker No    Overview Signed 2/7/2022  3:52 PM by KAREL Walker     Treat in labor           35 weeks gestation of pregnancy 1/7/9618 by Emerald Lugo MD No

## 2022-06-02 ENCOUNTER — ROUTINE PRENATAL (OUTPATIENT)
Dept: OBGYN CLINIC | Facility: CLINIC | Age: 32
End: 2022-06-02

## 2022-06-02 VITALS
BODY MASS INDEX: 29.37 KG/M2 | HEIGHT: 64 IN | WEIGHT: 172 LBS | DIASTOLIC BLOOD PRESSURE: 72 MMHG | HEART RATE: 98 BPM | SYSTOLIC BLOOD PRESSURE: 107 MMHG

## 2022-06-02 DIAGNOSIS — Z3A.38 38 WEEKS GESTATION OF PREGNANCY: Primary | Chronic | ICD-10-CM

## 2022-06-02 DIAGNOSIS — R82.71 GBS BACTERIURIA: ICD-10-CM

## 2022-06-02 PROCEDURE — 99213 OFFICE O/P EST LOW 20 MIN: CPT | Performed by: OBSTETRICS & GYNECOLOGY

## 2022-06-02 NOTE — ASSESSMENT & PLAN NOTE
- Reporting pelvic and discharge  Speculum exam: No fluid leakage with valsalva, Moderate amount of discharge, Dry prep negative for ferning, KOH nega and Wet prep negative  SVE 2 5/60/-2, vertex  Labor precautions discussed     - Immunization: TDAP, COVID 19 x2 and Flu vaccines were given  - Contraception: Progestins; Depo-Nexplanon or IUD not decided yet

## 2022-06-03 ENCOUNTER — HOSPITAL ENCOUNTER (INPATIENT)
Facility: HOSPITAL | Age: 32
LOS: 2 days | Discharge: HOME | DRG: 560 | End: 2022-06-05
Attending: OBSTETRICS & GYNECOLOGY | Admitting: OBSTETRICS & GYNECOLOGY
Payer: COMMERCIAL

## 2022-06-03 ENCOUNTER — ANESTHESIA EVENT (INPATIENT)
Dept: ANESTHESIOLOGY | Facility: HOSPITAL | Age: 32
DRG: 560 | End: 2022-06-03
Payer: COMMERCIAL

## 2022-06-03 ENCOUNTER — ANESTHESIA (INPATIENT)
Dept: ANESTHESIOLOGY | Facility: HOSPITAL | Age: 32
DRG: 560 | End: 2022-06-03
Payer: COMMERCIAL

## 2022-06-03 DIAGNOSIS — Z3A.38 38 WEEKS GESTATION OF PREGNANCY: Primary | ICD-10-CM

## 2022-06-03 LAB
ABO GROUP BLD: NORMAL
BASE EXCESS BLDCOA CALC-SCNC: -2.5 MMOL/L (ref 3–11)
BASE EXCESS BLDCOV CALC-SCNC: -2.7 MMOL/L (ref 1–9)
BLD GP AB SCN SERPL QL: NEGATIVE
ERYTHROCYTE [DISTWIDTH] IN BLOOD BY AUTOMATED COUNT: 13.9 % (ref 11.6–15.1)
EXTERNAL GROUP B STREP ANTIGEN: POSITIVE
HCO3 BLDCOA-SCNC: 23.2 MMOL/L (ref 17.3–27.3)
HCO3 BLDCOV-SCNC: 22.8 MMOL/L (ref 12.2–28.6)
HCT VFR BLD AUTO: 40.2 % (ref 34.8–46.1)
HGB BLD-MCNC: 12.8 G/DL (ref 11.5–15.4)
MCH RBC QN AUTO: 27.6 PG (ref 26.8–34.3)
MCHC RBC AUTO-ENTMCNC: 31.8 G/DL (ref 31.4–37.4)
MCV RBC AUTO: 87 FL (ref 82–98)
O2 CT VFR BLDCOA CALC: 12.2 ML/DL
OXYHGB MFR BLDCOA: 55.2 %
OXYHGB MFR BLDCOV: 49 %
PCO2 BLDCOA: 43.1 MM[HG] (ref 30–60)
PCO2 BLDCOV: 42 MM HG (ref 27–43)
PH BLDCOA: 7.35 [PH] (ref 7.23–7.43)
PH BLDCOV: 7.35 [PH] (ref 7.19–7.49)
PLATELET # BLD AUTO: 247 THOUSANDS/UL (ref 149–390)
PMV BLD AUTO: 11.6 FL (ref 8.9–12.7)
PO2 BLDCOA: 23.1 MM HG (ref 5–25)
PO2 BLDCOV: 22 MM HG (ref 15–45)
RBC # BLD AUTO: 4.63 MILLION/UL (ref 3.81–5.12)
RH BLD: POSITIVE
SAO2 % BLDCOV: 10.6 ML/DL
SPECIMEN EXPIRATION DATE: NORMAL
WBC # BLD AUTO: 9.55 THOUSAND/UL (ref 4.31–10.16)

## 2022-06-03 PROCEDURE — NC001 PR NO CHARGE: Performed by: OBSTETRICS & GYNECOLOGY

## 2022-06-03 PROCEDURE — 82805 BLOOD GASES W/O2 SATURATION: CPT | Performed by: OBSTETRICS & GYNECOLOGY

## 2022-06-03 PROCEDURE — 59409 OBSTETRICAL CARE: CPT | Performed by: OBSTETRICS & GYNECOLOGY

## 2022-06-03 PROCEDURE — 85027 COMPLETE CBC AUTOMATED: CPT

## 2022-06-03 PROCEDURE — 86900 BLOOD TYPING SEROLOGIC ABO: CPT

## 2022-06-03 PROCEDURE — 86901 BLOOD TYPING SEROLOGIC RH(D): CPT

## 2022-06-03 PROCEDURE — 86592 SYPHILIS TEST NON-TREP QUAL: CPT

## 2022-06-03 PROCEDURE — 99214 OFFICE O/P EST MOD 30 MIN: CPT

## 2022-06-03 PROCEDURE — 0HQ9XZZ REPAIR PERINEUM SKIN, EXTERNAL APPROACH: ICD-10-PCS | Performed by: OBSTETRICS & GYNECOLOGY

## 2022-06-03 PROCEDURE — 86850 RBC ANTIBODY SCREEN: CPT

## 2022-06-03 PROCEDURE — 4A1HXCZ MONITORING OF PRODUCTS OF CONCEPTION, CARDIAC RATE, EXTERNAL APPROACH: ICD-10-PCS | Performed by: OBSTETRICS & GYNECOLOGY

## 2022-06-03 PROCEDURE — 99024 POSTOP FOLLOW-UP VISIT: CPT | Performed by: OBSTETRICS & GYNECOLOGY

## 2022-06-03 RX ORDER — DOCUSATE SODIUM 100 MG/1
100 CAPSULE, LIQUID FILLED ORAL 2 TIMES DAILY
Status: DISCONTINUED | OUTPATIENT
Start: 2022-06-03 | End: 2022-06-05 | Stop reason: HOSPADM

## 2022-06-03 RX ORDER — IBUPROFEN 600 MG/1
600 TABLET ORAL EVERY 6 HOURS PRN
Status: DISCONTINUED | OUTPATIENT
Start: 2022-06-03 | End: 2022-06-04

## 2022-06-03 RX ORDER — ROPIVACAINE HYDROCHLORIDE 2 MG/ML
INJECTION, SOLUTION EPIDURAL; INFILTRATION; PERINEURAL AS NEEDED
Status: DISCONTINUED | OUTPATIENT
Start: 2022-06-03 | End: 2022-06-03 | Stop reason: HOSPADM

## 2022-06-03 RX ORDER — FLUTICASONE PROPIONATE 50 MCG
2 SPRAY, SUSPENSION (ML) NASAL DAILY
Status: DISCONTINUED | OUTPATIENT
Start: 2022-06-03 | End: 2022-06-03

## 2022-06-03 RX ORDER — SODIUM CHLORIDE, SODIUM LACTATE, POTASSIUM CHLORIDE, CALCIUM CHLORIDE 600; 310; 30; 20 MG/100ML; MG/100ML; MG/100ML; MG/100ML
125 INJECTION, SOLUTION INTRAVENOUS CONTINUOUS
Status: DISCONTINUED | OUTPATIENT
Start: 2022-06-03 | End: 2022-06-03

## 2022-06-03 RX ORDER — OXYTOCIN/RINGER'S LACTATE 30/500 ML
250 PLASTIC BAG, INJECTION (ML) INTRAVENOUS ONCE
Status: COMPLETED | OUTPATIENT
Start: 2022-06-03 | End: 2022-06-03

## 2022-06-03 RX ORDER — ONDANSETRON 2 MG/ML
4 INJECTION INTRAMUSCULAR; INTRAVENOUS EVERY 8 HOURS PRN
Status: DISCONTINUED | OUTPATIENT
Start: 2022-06-03 | End: 2022-06-05 | Stop reason: HOSPADM

## 2022-06-03 RX ORDER — OXYTOCIN/RINGER'S LACTATE 30/500 ML
PLASTIC BAG, INJECTION (ML) INTRAVENOUS
Status: COMPLETED
Start: 2022-06-03 | End: 2022-06-03

## 2022-06-03 RX ORDER — DIAPER,BRIEF,INFANT-TODD,DISP
1 EACH MISCELLANEOUS DAILY PRN
Status: DISCONTINUED | OUTPATIENT
Start: 2022-06-03 | End: 2022-06-05 | Stop reason: HOSPADM

## 2022-06-03 RX ORDER — SENNOSIDES 8.6 MG
1 TABLET ORAL
Status: DISCONTINUED | OUTPATIENT
Start: 2022-06-03 | End: 2022-06-05 | Stop reason: HOSPADM

## 2022-06-03 RX ORDER — SIMETHICONE 80 MG
80 TABLET,CHEWABLE ORAL 4 TIMES DAILY PRN
Status: DISCONTINUED | OUTPATIENT
Start: 2022-06-03 | End: 2022-06-05 | Stop reason: HOSPADM

## 2022-06-03 RX ORDER — LIDOCAINE HYDROCHLORIDE AND EPINEPHRINE 15; 5 MG/ML; UG/ML
INJECTION, SOLUTION EPIDURAL AS NEEDED
Status: DISCONTINUED | OUTPATIENT
Start: 2022-06-03 | End: 2022-06-03 | Stop reason: HOSPADM

## 2022-06-03 RX ORDER — CALCIUM CARBONATE 200(500)MG
1000 TABLET,CHEWABLE ORAL DAILY PRN
Status: DISCONTINUED | OUTPATIENT
Start: 2022-06-03 | End: 2022-06-05 | Stop reason: HOSPADM

## 2022-06-03 RX ORDER — ONDANSETRON 2 MG/ML
4 INJECTION INTRAMUSCULAR; INTRAVENOUS EVERY 6 HOURS PRN
Status: DISCONTINUED | OUTPATIENT
Start: 2022-06-03 | End: 2022-06-03

## 2022-06-03 RX ORDER — ACETAMINOPHEN 325 MG/1
650 TABLET ORAL EVERY 4 HOURS PRN
Status: DISCONTINUED | OUTPATIENT
Start: 2022-06-03 | End: 2022-06-05 | Stop reason: HOSPADM

## 2022-06-03 RX ORDER — ACETAMINOPHEN 325 MG/1
650 TABLET ORAL EVERY 6 HOURS PRN
Status: DISCONTINUED | OUTPATIENT
Start: 2022-06-03 | End: 2022-06-03

## 2022-06-03 RX ORDER — CALCIUM CARBONATE 200(500)MG
500 TABLET,CHEWABLE ORAL DAILY PRN
Status: DISCONTINUED | OUTPATIENT
Start: 2022-06-03 | End: 2022-06-03

## 2022-06-03 RX ORDER — BUTORPHANOL TARTRATE 1 MG/ML
1 INJECTION, SOLUTION INTRAMUSCULAR; INTRAVENOUS ONCE
Status: COMPLETED | OUTPATIENT
Start: 2022-06-03 | End: 2022-06-03

## 2022-06-03 RX ADMIN — HYDROCORTISONE 1 APPLICATION: 1 CREAM TOPICAL at 15:48

## 2022-06-03 RX ADMIN — ACETAMINOPHEN 650 MG: 325 TABLET ORAL at 21:31

## 2022-06-03 RX ADMIN — Medication 250 UNITS: at 12:56

## 2022-06-03 RX ADMIN — SODIUM CHLORIDE, SODIUM LACTATE, POTASSIUM CHLORIDE, AND CALCIUM CHLORIDE 125 ML/HR: .6; .31; .03; .02 INJECTION, SOLUTION INTRAVENOUS at 07:45

## 2022-06-03 RX ADMIN — DOCUSATE SODIUM 100 MG: 100 CAPSULE, LIQUID FILLED ORAL at 17:26

## 2022-06-03 RX ADMIN — LIDOCAINE HYDROCHLORIDE AND EPINEPHRINE 5 ML: 15; 5 INJECTION, SOLUTION EPIDURAL at 09:49

## 2022-06-03 RX ADMIN — Medication 1 APPLICATION: at 15:48

## 2022-06-03 RX ADMIN — SODIUM CHLORIDE, SODIUM LACTATE, POTASSIUM CHLORIDE, AND CALCIUM CHLORIDE 999 ML/HR: .6; .31; .03; .02 INJECTION, SOLUTION INTRAVENOUS at 09:15

## 2022-06-03 RX ADMIN — SODIUM CHLORIDE, SODIUM LACTATE, POTASSIUM CHLORIDE, AND CALCIUM CHLORIDE 125 ML/HR: .6; .31; .03; .02 INJECTION, SOLUTION INTRAVENOUS at 11:48

## 2022-06-03 RX ADMIN — ACETAMINOPHEN 650 MG: 325 TABLET ORAL at 17:26

## 2022-06-03 RX ADMIN — WITCH HAZEL 1 PAD: 500 SOLUTION RECTAL; TOPICAL at 15:48

## 2022-06-03 RX ADMIN — SODIUM CHLORIDE 5 MILLION UNITS: 9 INJECTION, SOLUTION INTRAVENOUS at 07:47

## 2022-06-03 RX ADMIN — ROPIVACAINE HYDROCHLORIDE 5 ML: 2 INJECTION, SOLUTION EPIDURAL; INFILTRATION at 09:54

## 2022-06-03 RX ADMIN — IBUPROFEN 600 MG: 600 TABLET ORAL at 15:48

## 2022-06-03 RX ADMIN — ROPIVACAINE HYDROCHLORIDE: 2 INJECTION, SOLUTION EPIDURAL; INFILTRATION at 09:58

## 2022-06-03 RX ADMIN — SODIUM CHLORIDE 2.5 MILLION UNITS: 9 INJECTION, SOLUTION INTRAVENOUS at 11:48

## 2022-06-03 RX ADMIN — BUTORPHANOL TARTRATE 1 MG: 1 INJECTION, SOLUTION INTRAMUSCULAR; INTRAVENOUS at 07:45

## 2022-06-03 NOTE — H&P
[facility] 36 y o  female MRN: [id number]  Unit/Bed#: LD TRIAGE 2- Encounter: [id number]      Assessment: 36 y o  Magnolia Dominguez at 38w4d admitted for labor   SVE: 2  FHT: category 2  Clinical EFW: 7lb by Leopold's, 42% at [address] [place] [address] confirmed by exam  GBS status: positive   Postpartum contraception plan: undecided between depo vs  Mirena vs  Nexplanon      Plan:   · Admit  · CBC, RPR, Type & Screen  · Stadol for pain management to start, then if in labor, epidural prn  · Expectant management  · Penicillin for GBS positive status    Dr Mely Brooks aware        SUBJECTIVE:    Chief Complaint: contractions    HPI: Vaishnavi Rich is a 36 y o  Magnolia Dominguez with an DEMETRIUS of 2022, by Ultrasound at 38w4d who is being admitted for labor   She complains of uterine contractions, occurring every 2-3 minutes, has no LOF, and reports no VB  She states she has felt good FM  Pregnancy complications: GBS positive    Patient Active Problem List   Diagnosis    Rhinitis medicamentosa    Chronic rhinitis    Psoriasis    38 weeks gestation of pregnancy    Headache    GBS bacteriuria    Third trimester pregnancy    Other abnormal glucose    Abnormal glucose affecting pregnancy    Language barrier       Baby complications/comments: none    Review of Systems   Constitutional: Negative for chills and fever  Eyes: Negative for visual disturbance  Respiratory: Negative for cough and shortness of breath  Cardiovascular: Negative for leg swelling  Gastrointestinal: Negative for abdominal pain, diarrhea, nausea and vomiting  Genitourinary: Negative for dysuria  Neurological: Negative for dizziness, light-headedness and headaches         OB History    Para Term  AB Living   2 1 1     1   SAB IAB Ectopic Multiple Live Births           1      # Outcome Date GA Lbr Craig/2nd Weight Sex Delivery Anes PTL Lv   2 Current            1 Term 20 40w0d  3629 g (8 lb) M Vag-Spont EPI  MCKENZIE       Past Medical History:   Diagnosis Date    Allergic     Varicella        Past Surgical History:   Procedure Laterality Date    NO PAST SURGERIES      TONSILLECTOMY         Social History     Tobacco Use    Smoking status: Never Smoker    Smokeless tobacco: Never Used   Substance Use Topics    Alcohol use: Never       No Known Allergies    Medications Prior to Admission   Medication    docusate sodium (COLACE) 100 mg capsule    fluticasone (FLONASE) 50 mcg/act nasal spray    magnesium oxide (MAG-OX) 400 mg    Prenatal Vit-Fe Fumarate-FA (PRENATAL PO)           OBJECTIVE:  Vitals:  Temp:  [97 8 °F (36 6 °C)] 97 8 °F (36 6 °C)  HR:  [88-98] 88  Resp:  [20] 20  BP: (107-113)/(59-72) 113/59  Body mass index is 29 52 kg/m²  Physical Exam:  Physical Exam  Constitutional:       General: She is not in acute distress  Appearance: Normal appearance  She is not ill-appearing  HENT:      Mouth/Throat:      Mouth: Mucous membranes are moist    Eyes:      Extraocular Movements: Extraocular movements intact  Cardiovascular:      Rate and Rhythm: Normal rate and regular rhythm  Heart sounds: Normal heart sounds  Pulmonary:      Effort: Pulmonary effort is normal       Breath sounds: Normal breath sounds  Abdominal:      General: There is no distension  Palpations: Abdomen is soft  Tenderness: There is no abdominal tenderness  Musculoskeletal:         General: No swelling  Right lower leg: No edema  Left lower leg: No edema  Neurological:      General: No focal deficit present  Mental Status: She is alert  Skin:     General: Skin is warm and dry  Coloration: Skin is not jaundiced or pale  Psychiatric:         Mood and Affect: Mood normal          Behavior: Behavior normal          Thought Content:  Thought content normal          Judgment: Judgment normal           SVE:  4/70/-2    FHT:  Baseline: 140 bpm  Variability: moderate  Accelerations: present  Decelerations: absent  Category 2    TOCO:        Lab Results   Component Value Date    WBC 7 91 03/22/2022    HGB 11 3 (L) 03/22/2022    HCT 34 7 (L) 03/22/2022     03/22/2022     Lab Results   Component Value Date    K 3 7 10/26/2021     10/26/2021    CO2 24 10/26/2021    BUN 11 10/26/2021    CREATININE 0 67 10/26/2021    AST 23 10/26/2021    ALT 55 01/11/2022       Prenatal Labs: Reviewed      Blood type: O+  Antibody: negative  Group B strep: positive  HIV: negative  Hepatitis B: negative  RPR: non-reactive  Rubella: Immune  Varicella: Unknown  1 hour Glucose: 162  3 hour glucose: 94, 154, 129, 114  Platelets: 720  Hgb: 11 9    >2 Midnights  INPATIENT       Fanta Abernathy MD  OB/GYN PGY-1  6/3/2022  7:29 AM

## 2022-06-03 NOTE — PLAN OF CARE
Problem: ANTEPARTUM  Goal: Maintain pregnancy as long as maternal and/or fetal condition is stable  Description: INTERVENTIONS:  - Maternal surveillance  - Fetal surveillance  - Monitor uterine activity  - Medications as ordered  - Bedrest  6/3/2022 1310 by Lawrence Cassidy RN  Outcome: Completed  6/3/2022 1002 by Lawrence Cassidy RN  Outcome: Progressing     Problem: BIRTH - VAGINAL/ SECTION  Goal: Fetal and maternal status remain reassuring during the birth process  Description: INTERVENTIONS:  - Monitor vital signs  - Monitor fetal heart rate  - Monitor uterine activity  - Monitor labor progression (vaginal delivery)  - DVT prophylaxis  - Antibiotic prophylaxis  6/3/2022 1310 by Lawrence Cassidy RN  Outcome: Completed  6/3/2022 1002 by Lawrence Cassidy RN  Outcome: Progressing  Goal: Emotionally satisfying birthing experience for mother/fetus  Description: Interventions:  - Assess, plan, implement and evaluate the nursing care given to the patient in labor  - Advocate the philosophy that each childbirth experience is a unique experience and support the family's chosen level of involvement and control during the labor process   - Actively participate in both the patient's and family's teaching of the birth process  - Consider cultural, Latter-day and age-specific factors and plan care for the patient in labor  6/3/2022 1310 by Lawrence Cassiyd RN  Outcome: Completed  6/3/2022 1002 by Lawrence Cassidy RN  Outcome: Progressing     Problem: PAIN - ADULT  Goal: Verbalizes/displays adequate comfort level or baseline comfort level  Description: Interventions:  - Encourage patient to monitor pain and request assistance  - Assess pain using appropriate pain scale  - Administer analgesics based on type and severity of pain and evaluate response  - Implement non-pharmacological measures as appropriate and evaluate response  - Consider cultural and social influences on pain and pain management  - Notify physician/advanced practitioner if interventions unsuccessful or patient reports new pain  6/3/2022 1310 by Jesus Garcia RN  Outcome: Progressing  6/3/2022 1002 by Jesus Garcia RN  Outcome: Progressing     Problem: INFECTION - ADULT  Goal: Absence or prevention of progression during hospitalization  Description: INTERVENTIONS:  - Assess and monitor for signs and symptoms of infection  - Monitor lab/diagnostic results  - Monitor all insertion sites, i e  indwelling lines, tubes, and drains  - Monitor endotracheal if appropriate and nasal secretions for changes in amount and color  - [place] appropriate cooling/warming therapies per order  - Administer medications as ordered  - Instruct and encourage patient and family to use good hand hygiene technique  - Identify and instruct in appropriate isolation precautions for identified infection/condition  6/3/2022 1310 by Jesus Garcia RN  Outcome: Progressing  6/3/2022 1002 by Jesus Garcia RN  Outcome: Progressing  Goal: Absence of fever/infection during neutropenic period  Description: INTERVENTIONS:  - Monitor WBC    6/3/2022 1310 by Jesus Garcia RN  Outcome: Progressing  6/3/2022 1002 by Jesus Garcia RN  Outcome: Progressing     Problem: SAFETY ADULT  Goal: Patient will remain free of falls  Description: INTERVENTIONS:  - Educate patient/family on patient safety including physical limitations  - Instruct patient to call for assistance with activity   - Consult OT/PT to assist with strengthening/mobility   - Keep Call bell within reach  - Keep bed low and locked with side rails adjusted as appropriate  - Keep care items and personal belongings within reach  - Initiate and maintain comfort rounds  - Make Fall Risk Sign visible to staff  - Offer Toileting every  Hours, in advance of need  - Initiate/Maintain alarm  - Obtain necessary fall risk management equipment:   - Apply yellow socks and bracelet for high fall risk patients  - Consider moving patient to room near nurses station  6/3/2022 1310 by Graham Azul RN  Outcome: Progressing  6/3/2022 1002 by Graham Azul RN  Outcome: Progressing  Goal: Maintain or return to baseline ADL function  Description: INTERVENTIONS:  -  Assess patient's ability to carry out ADLs; assess patient's baseline for ADL function and identify physical deficits which impact ability to perform ADLs (bathing, care of mouth/teeth, toileting, grooming, dressing, etc )  - Assess/evaluate cause of self-care deficits   - Assess range of motion  - Assess patient's mobility; develop plan if impaired  - Assess patient's need for assistive devices and provide as appropriate  - Encourage maximum independence but intervene and supervise when necessary  - Involve family in performance of ADLs  - Assess for home care needs following discharge   - Consider OT consult to assist with ADL evaluation and planning for discharge  - Provide patient education as appropriate  6/3/2022 1310 by Graham Azul RN  Outcome: Progressing  6/3/2022 1002 by Graham Azul RN  Outcome: Progressing  Goal: Maintains/Returns to pre admission functional level  Description: INTERVENTIONS:  - Perform BMAT or MOVE assessment daily    - Set and communicate daily mobility goal to care team and patient/family/caregiver  - Collaborate with rehabilitation services on mobility goals if consulted  - Perform Range of Motion  times a day  - Reposition patient every  hours    - Dangle patient  times a day  - Stand patient  times a day  - Ambulate patient  times a day  - Out of bed to chair  times a day   - Out of bed for meals times a day  - Out of bed for toileting  - Record patient progress and toleration of activity level   6/3/2022 1310 by Graham Azul RN  Outcome: Progressing  6/3/2022 1002 by Graham Azul RN  Outcome: Progressing     Problem: Knowledge Deficit  Goal: Patient/family/caregiver demonstrates understanding of disease process, treatment plan, medications, and discharge instructions  Description: Complete learning assessment and assess knowledge base  Interventions:  - Provide teaching at level of understanding  - Provide teaching via preferred learning methods  6/3/2022 1310 by Amy Forbes RN  Outcome: Progressing  6/3/2022 1002 by Amy Forbes RN  Outcome: Progressing  Goal: Verbalizes understanding of labor plan  Description: Assess patient/family/caregiver's baseline knowledge level and ability to understand information  Provide education via patient/family/caregiver's preferred learning method at appropriate level of understanding  1  Provide teaching at level of understanding  2  Provide teaching via preferred learning method(s)  6/3/2022 1310 by Amy Forbes RN  Outcome: Completed  6/3/2022 1002 by Amy Forbes RN  Outcome: Progressing     Problem: DISCHARGE PLANNING  Goal: Discharge to home or other facility with appropriate resources  Description: INTERVENTIONS:  - Identify barriers to discharge w/patient and caregiver  - Arrange for needed discharge resources and transportation as appropriate  - Identify discharge learning needs (meds, wound care, etc )  - Arrange for interpretive services to assist at discharge as needed  - Refer to Case Management Department for coordinating discharge planning if the patient needs post-hospital services based on physician/advanced practitioner order or complex needs related to functional status, cognitive ability, or social support system  6/3/2022 1310 by Amy Forbes RN  Outcome: Progressing  6/3/2022 1002 by Amy Forbes RN  Outcome: Progressing     Problem: Labor & Delivery  Goal: Manages discomfort  Description: Assess and monitor for signs and symptoms of discomfort  Assess patient's pain level regularly and per hospital policy  Administer medications as ordered  Support use of nonpharmacological methods to help control pain such as distraction, imagery, relaxation, and application of heat and cold  Collaborate with interdisciplinary team and patient to determine appropriate pain management plan  1  Include patient in decisions related to comfort  2  Offer non-pharmacological pain management interventions  3  Report ineffective pain management to physician  6/3/2022 1310 by Colin Camacho RN  Outcome: Completed  6/3/2022 1002 by Colin Camacho RN  Outcome: Progressing  Goal: Patient vital signs are stable  Description: 1  Assess vital signs - vaginal delivery    6/3/2022 1310 by Colin Camacho RN  Outcome: Progressing  6/3/2022 1002 by Colin Camacho RN  Outcome: Progressing

## 2022-06-03 NOTE — DISCHARGE SUMMARY
Discharge Summary - Arnoldo Donaldson 36 y o  female MRN: [id number]    Unit/Bed#: -01 Encounter: [id number]    Admission Date: 6/3/2022     Discharge Date: 2022    Admitting Attending: Stephany Aguiar MD  Delivering Attending: Stephany Aguiar MD   Discharge Attending: Jane Sepulveda MD      Diagnosis:   1  Pregnancy at 38 weeks of gestation   2  GBS bacteruria   3  Delivery of term     Procedures: Spontaneous Vaginal Delivery    Complications: none apparent    Hospital Course: Patient was admitted for labor  On arrival to labor and delivery she was found to be 470/-2 and experiencing painful contractions  She was started on penicillin for GBS prophylaxis and received a dose of stadol for analgesia  She made change to /-1 and received an epidural for analgesia  She progressed to complete and began pushing  She delivered via  with a first degree laceration  She had an uncomplicated postpartum course  Condition at discharge: good     On day of discharge, patient was tolerating PO, passing flatus, urinating, and ambulating  Her pain was well controlled with oral analgesics  She was discharged home on postpartum day #2 with standard post partum instructions to follow up with her physician in 3-6 weeks for a postpartum appointment  Discharge instructions/Information to patient and family:   - Do not place anything (no partner, tampons or douche) in your vagina for 6 weeks  -You may walk for exercise for the first 6 weeks then gradually return to your usual activities    -Please do not drive for 1 week if you have no stitches and for 2 weeks if you have stitches or underwent a  delivery     -You may take baths or shower per your preference    -Please look at your bust (breasts) in the mirror daily and call for redness or tenderness or increased warmth    -Please call us for temperature > 100 4*F or 38* C, worsening pain or a foul discharge        Discharge Medications:   Prenatal vitamin daily for 6 months or the duration of nursing whichever is longer  Motrin 600 mg orally every 6 hours as needed for pain  Tylenol (over the counter) per bottle directions as needed for pain: do NOT use with percocet  Hydrocortisone cream 1% (over the counter) applied 1-2x daily to hemorrhoids as needed    Provisions for Follow-Up Care: Follow up with your doctor in 6 weeks for postpartum care       Planned Readmission: No

## 2022-06-03 NOTE — OB LABOR/OXYTOCIN SAFETY PROGRESS
Labor Progress Note - Lori Puff 36 y o  female MRN: [id number]    Unit/Bed#: -01 Encounter: [id number]       Contraction Frequency (minutes): 1-2  Contraction Quality: Moderate  Tachysystole: No   Cervical Dilation: 10  Dilation Complete Date: 06/03/22  Dilation Complete Time: 1209  Cervical Effacement: 100  Fetal Station: 1  Baseline Rate: 145 bpm  Fetal Heart Rate: 140 BPM  FHR Category: Category I               Vital Signs:   Vitals:    06/03/22 1152   BP: 110/67   Pulse: 85   Resp:    Temp:    SpO2:        Notes/comments:   Due for check  With window of pain on right side  SVE at this time 10/100/+1  FHT remains category I  Repositioning to aid in analgesia  Would like to wait 20 mins and start pushing     Anthony Woodward MD 6/3/2022 12:21 PM

## 2022-06-03 NOTE — ANESTHESIA POSTPROCEDURE EVALUATION
Post-Op Assessment Note    CV Status:  Stable  Pain Score: 0    Pain management: adequate     Mental Status:  Alert and awake   Hydration Status:  Euvolemic   PONV Controlled:  Controlled   Airway Patency:  Patent      Post Op Vitals Reviewed: Yes      Staff: CRNA     Post-op block assessment: catheter intact and no complications      No complications documented      BP   111/59   Temp      Pulse  85   Resp      SpO2

## 2022-06-03 NOTE — L&D DELIVERY NOTE
Vaginal Delivery Summary - OB/GYN   Tisha Bustillo 36 y o  female MRN: [id number]  Unit/Bed#: -01 Encounter: [id number]          Predelivery Diagnosis:  1  Pregnancy at 38 weeks of gestation   2  GBS bacteruria     Postdelivery Diagnosis:  1  Same as above  2  Delivery of term     Procedure: Spontaneous Vaginal Delivery, repair of first degree laceration    Attending: Martinez Osborne    Assistant: Jayesh Gonzales    Anesthesia: Epidural    QBL: 179cc  Admission H 8  Admission platelets: 226    Complications: none apparent    Specimens: cord blood, arterial and venous cord blood gasses, placenta to storage    Findings:   1  Viable female at 1254, with APGARS of 9 and 9 at 1 and 5 minutes respectively,  2  Spontaneous delivery of intact placenta   3  1 degree laceration repaired with 3-0 vicryl rapide  4  Blood gases:    Umbilical Cord Venous Blood Gas:  Results from last 7 days   Lab Units 22  1255   PH COV  7 352   PCO2 COV mm HG 42 0   HCO3 COV mmol/L 22 8   BASE EXC COV mmol/L -2 7*   O2 CT CD VB mL/dL 10 6   O2 HGB, VENOUS CORD % 04 5     Umbilical Cord Arterial Blood Gas:  Results from last 7 days   Lab Units 22  1255   PH COA  7 348   PCO2 COA  43 1   PO2 COA mm HG 23 1   HCO3 COA mmol/L 23 2   BASE EXC COA mmol/L -2 5*   O2 CONTENT CORD ART ml/dl 12 2   O2 HGB, ARTERIAL CORD % 55 2       Disposition:  Patient tolerated the procedure well and was recovering in labor and delivery room     Brief history and labor course:  Ms Tisha Bustillo is a 36 y o   at 38wk4d  On arrival to labor and delivery she was found to be 4/70/-2 and experiencing painful contractions  She was started on penicillin for GBS prophylaxis and received a dose of stadol for analgesia  She made change to 6/80/-1 and received an epidural for analgesia  She progressed to complete and began pushing       Description of procedure    After pushing for 4 minutes, at 1250 patient delivered a viable female , wt pending, apgars of 9 (1 min) and 9 (5 min)  The fetal vertex delivered spontaneously and restituted ROBBY  Baby was checked for nuchal  No nuchal cord was noted    The anterior shoulder (fetal right) delivered atraumatically with maternal expulsive forces and the assistance of gentle downward traction  The posterior shoulder delivered with maternal expulsive forces and the assistance of gentle upward traction  The remainder of the fetus delivered spontaneously  Upon delivery, the infant was placed on the mothers abdomen and the cord was clamped and cut  Delayed cord clamping was performed  The infant was noted to cry spontaneously and was moving all extremities appropriately  There was no evidence for injury  Awaiting nurse resuscitators evaluated the   Arterial and venous cord blood gases and cord blood was collected for analysis  These were promptly sent to the lab  In the immediate post-partum, 30 units of IV pitocin was administered, and the uterus was noted to contract down well with massage and pitocin  The placenta delivered spontaneously at 1301 and was noted to have a centrally inserted 3 vessel cord  The vagina, cervix, perineum, and rectum were inspected and there was noted to be a first degree perineal laceration  Using a 3-0 vicryl rapide, the apex of the laceration was identified and the vaginal mucosa was reapproximated in a running locked fashion  The stitch was then brought down and a single transverse stitch was placed  The suture was then brought to the apex of the skin defect and was closed in a running subcuticular fashion  There was noted to be a small amount of bleeding despite adequate repair  A gentle bimanual exam was performed and the lower uterine segment was evacuated of clots  The uterus was noted to contract down and bleeding slowed  At the conclusion of the procedure, all needle, sponge, and instrument counts were noted to be correct  Patient tolerated the procedure well and was allowed to recover in labor and delivery room with family and  before being transferred to the post-partum floor  Dr Clarita Gomez was present and participated in all key portions of the case          Layton Silvestre MD  6/3/2022  1:24 PM

## 2022-06-03 NOTE — OB LABOR/OXYTOCIN SAFETY PROGRESS
Oxytocin Safety Progress Check Note - Sergio Mondragon 36 y o  female MRN: [id number]    Unit/Bed#: -01 Encounter: [id number]       Contraction Frequency (minutes): 4-5  Contraction Quality: Moderate      Cervical Dilation: 6        Cervical Effacement: 80  Fetal Station: -1  Baseline Rate: 130 bpm  Fetal Heart Rate: 135 BPM  FHR Category: Category II               Vital Signs:   Vitals:    06/03/22 0838   BP: 146/86   Pulse:    Resp:    Temp:    SpO2:        Notes/comments:   Patient very uncomfortable at this time  SVE at this time 6/80/-1  Will plan for epidural and AROM      Fanny Santos MD 6/3/2022 9:25 AM

## 2022-06-03 NOTE — ANESTHESIA PREPROCEDURE EVALUATION
Procedure:  LABOR ANALGESIA    Relevant Problems   GYN   (+) 38 weeks gestation of pregnancy      NEURO/PSYCH   (+) Headache      Other   (+) GBS bacteriuria        Physical Exam    Airway    Mallampati score: II  TM Distance: >3 FB  Neck ROM: full     Dental   No notable dental hx     Cardiovascular      Pulmonary      Other Findings        Anesthesia Plan  ASA Score- 2     Anesthesia Type- epidural with ASA Monitors  Additional Monitors:   Airway Plan:           Plan Factors-    Chart reviewed  Patient is not a current smoker  Patient not instructed to abstain from smoking on day of procedure  Patient did not smoke on day of surgery  Induction-     Postoperative Plan-     Informed Consent- Anesthetic plan and risks discussed with patient  I personally reviewed this patient with the CRNA  Discussed and agreed on the Anesthesia Plan with the CRNA  Kp Leach Epidural discussed with patient  Side effects, including post dural puncture headache discussed  All questions answered  Consent given by patient     used for consent  Lab Results   Component Value Date    GLUF 94 04/25/2022    ALT 55 01/11/2022    AST 23 10/26/2021    BUN 11 10/26/2021    CALCIUM 9 5 10/26/2021     10/26/2021    CO2 24 10/26/2021    CREATININE 0 67 10/26/2021    HDL 46 (L) 01/11/2022    HCT 40 2 06/03/2022    HGB 12 8 06/03/2022     06/03/2022    K 3 7 10/26/2021    TRIG 219 (H) 01/11/2022    WBC 9 55 06/03/2022

## 2022-06-03 NOTE — ANESTHESIA PROCEDURE NOTES
Epidural Block    Patient location during procedure: OB  Start time: 6/3/2022 9:22 AM  Reason for block: procedure for pain  Staffing  Performed: Anesthesiologist, Other anesthesia staff and CRNA   Anesthesiologist: Kenyetta Zamora MD  Resident/CRNA: Jobe Bloch, CRNA  Preanesthetic Checklist  Completed: patient identified, IV checked, risks and benefits discussed, surgical consent, monitors and equipment checked, pre-op evaluation and timeout performed  Epidural  Patient position: sitting  Prep: ChloraPrep and site prepped and draped  Patient monitoring: heart rate, continuous pulse ox and frequent blood pressure checks  Approach: midline  Location: lumbar  Injection technique: OLI air  Needle  Needle type: Tuohy   Needle gauge: 18 G  Catheter type: side hole  Catheter size: 20 G  Catheter at skin depth: 11 cm  Catheter securement method: stabilization device  Test dose: negative  Assessment  Number of attempts: 2negative aspiration for CSF, negative aspiration for heme and no paresthesia on injection  patient tolerated the procedure well with no immediate complications  Additional Notes  Epidural X 2 attempts w/o incident

## 2022-06-03 NOTE — PLAN OF CARE
Problem: ANTEPARTUM  Goal: Maintain pregnancy as long as maternal and/or fetal condition is stable  Description: INTERVENTIONS:  - Maternal surveillance  - Fetal surveillance  - Monitor uterine activity  - Medications as ordered  - Bedrest  Outcome: Progressing     Problem: BIRTH - VAGINAL/ SECTION  Goal: Fetal and maternal status remain reassuring during the birth process  Description: INTERVENTIONS:  - Monitor vital signs  - Monitor fetal heart rate  - Monitor uterine activity  - Monitor labor progression (vaginal delivery)  - DVT prophylaxis  - Antibiotic prophylaxis  Outcome: Progressing  Goal: Emotionally satisfying birthing experience for mother/fetus  Description: Interventions:  - Assess, plan, implement and evaluate the nursing care given to the patient in labor  - Advocate the philosophy that each childbirth experience is a unique experience and support the family's chosen level of involvement and control during the labor process   - Actively participate in both the patient's and family's teaching of the birth process  - Consider cultural, Sabianist and age-specific factors and plan care for the patient in labor  Outcome: Progressing     Problem: PAIN - ADULT  Goal: Verbalizes/displays adequate comfort level or baseline comfort level  Description: Interventions:  - Encourage patient to monitor pain and request assistance  - Assess pain using appropriate pain scale  - Administer analgesics based on type and severity of pain and evaluate response  - Implement non-pharmacological measures as appropriate and evaluate response  - Consider cultural and social influences on pain and pain management  - Notify physician/advanced practitioner if interventions unsuccessful or patient reports new pain  Outcome: Progressing     Problem: INFECTION - ADULT  Goal: Absence or prevention of progression during hospitalization  Description: INTERVENTIONS:  - Assess and monitor for signs and symptoms of infection  - Monitor lab/diagnostic results  - Monitor all insertion sites, i e  indwelling lines, tubes, and drains  - Monitor endotracheal if appropriate and nasal secretions for changes in amount and color  - [place] appropriate cooling/warming therapies per order  - Administer medications as ordered  - Instruct and encourage patient and family to use good hand hygiene technique  - Identify and instruct in appropriate isolation precautions for identified infection/condition  Outcome: Progressing  Goal: Absence of fever/infection during neutropenic period  Description: INTERVENTIONS:  - Monitor WBC    Outcome: Progressing     Problem: SAFETY ADULT  Goal: Patient will remain free of falls  Description: INTERVENTIONS:  - Educate patient/family on patient safety including physical limitations  - Instruct patient to call for assistance with activity   - Consult OT/PT to assist with strengthening/mobility   - Keep Call bell within reach  - Keep bed low and locked with side rails adjusted as appropriate  - Keep care items and personal belongings within reach  - Initiate and maintain comfort rounds  - Make Fall Risk Sign visible to staff  - Offer Toileting every  Hours, in advance of need  - Initiate/Maintain alarm  - Obtain necessary fall risk management equipment:   - Apply yellow socks and bracelet for high fall risk patients  - Consider moving patient to room near nurses station  Outcome: Progressing  Goal: Maintain or return to baseline ADL function  Description: INTERVENTIONS:  -  Assess patient's ability to carry out ADLs; assess patient's baseline for ADL function and identify physical deficits which impact ability to perform ADLs (bathing, care of mouth/teeth, toileting, grooming, dressing, etc )  - Assess/evaluate cause of self-care deficits   - Assess range of motion  - Assess patient's mobility; develop plan if impaired  - Assess patient's need for assistive devices and provide as appropriate  - Encourage maximum independence but intervene and supervise when necessary  - Involve family in performance of ADLs  - Assess for home care needs following discharge   - Consider OT consult to assist with ADL evaluation and planning for discharge  - Provide patient education as appropriate  Outcome: Progressing  Goal: Maintains/Returns to pre admission functional level  Description: INTERVENTIONS:  - Perform BMAT or MOVE assessment daily    - Set and communicate daily mobility goal to care team and patient/family/caregiver  - Collaborate with rehabilitation services on mobility goals if consulted  - Perform Range of Motion  times a day  - Reposition patient every  hours  - Dangle patient  times a day  - Stand patient  times a day  - Ambulate patient  times a day  - Out of bed to chair  times a day   - Out of bed for meals  times a day  - Out of bed for toileting  - Record patient progress and toleration of activity level   Outcome: Progressing     Problem: Knowledge Deficit  Goal: Patient/family/caregiver demonstrates understanding of disease process, treatment plan, medications, and discharge instructions  Description: Complete learning assessment and assess knowledge base  Interventions:  - Provide teaching at level of understanding  - Provide teaching via preferred learning methods  Outcome: Progressing  Goal: Verbalizes understanding of labor plan  Description: Assess patient/family/caregiver's baseline knowledge level and ability to understand information  Provide education via patient/family/caregiver's preferred learning method at appropriate level of understanding  1  Provide teaching at level of understanding  2  Provide teaching via preferred learning method(s)    Outcome: Progressing     Problem: DISCHARGE PLANNING  Goal: Discharge to home or other facility with appropriate resources  Description: INTERVENTIONS:  - Identify barriers to discharge w/patient and caregiver  - Arrange for needed discharge resources and transportation as appropriate  - Identify discharge learning needs (meds, wound care, etc )  - Arrange for interpretive services to assist at discharge as needed  - Refer to Case Management Department for coordinating discharge planning if the patient needs post-hospital services based on physician/advanced practitioner order or complex needs related to functional status, cognitive ability, or social support system  Outcome: Progressing     Problem: Labor & Delivery  Goal: Manages discomfort  Description: Assess and monitor for signs and symptoms of discomfort  Assess patient's pain level regularly and per hospital policy  Administer medications as ordered  Support use of nonpharmacological methods to help control pain such as distraction, imagery, relaxation, and application of heat and cold  Collaborate with interdisciplinary team and patient to determine appropriate pain management plan  1  Include patient in decisions related to comfort  2  Offer non-pharmacological pain management interventions  3  Report ineffective pain management to physician  Outcome: Progressing  Goal: Patient vital signs are stable  Description: 1  Assess vital signs - vaginal delivery    Outcome: Progressing

## 2022-06-04 PROCEDURE — 99024 POSTOP FOLLOW-UP VISIT: CPT | Performed by: OBSTETRICS & GYNECOLOGY

## 2022-06-04 RX ORDER — IBUPROFEN 600 MG/1
600 TABLET ORAL EVERY 6 HOURS PRN
Qty: 30 TABLET | Refills: 0 | Status: CANCELLED
Start: 2022-06-04

## 2022-06-04 RX ORDER — KETOROLAC TROMETHAMINE 30 MG/ML
30 INJECTION, SOLUTION INTRAMUSCULAR; INTRAVENOUS EVERY 6 HOURS SCHEDULED
Status: COMPLETED | OUTPATIENT
Start: 2022-06-04 | End: 2022-06-04

## 2022-06-04 RX ORDER — IBUPROFEN 600 MG/1
600 TABLET ORAL EVERY 6 HOURS SCHEDULED
Status: DISCONTINUED | OUTPATIENT
Start: 2022-06-04 | End: 2022-06-05 | Stop reason: HOSPADM

## 2022-06-04 RX ORDER — MEDROXYPROGESTERONE ACETATE 150 MG/ML
150 INJECTION, SUSPENSION INTRAMUSCULAR ONCE
Status: DISCONTINUED | OUTPATIENT
Start: 2022-06-04 | End: 2022-06-05 | Stop reason: HOSPADM

## 2022-06-04 RX ADMIN — DOCUSATE SODIUM 100 MG: 100 CAPSULE, LIQUID FILLED ORAL at 08:46

## 2022-06-04 RX ADMIN — KETOROLAC TROMETHAMINE 30 MG: 30 INJECTION, SOLUTION INTRAMUSCULAR at 13:23

## 2022-06-04 RX ADMIN — IBUPROFEN 600 MG: 600 TABLET ORAL at 00:47

## 2022-06-04 RX ADMIN — ACETAMINOPHEN 650 MG: 325 TABLET ORAL at 04:50

## 2022-06-04 RX ADMIN — KETOROLAC TROMETHAMINE 30 MG: 30 INJECTION, SOLUTION INTRAMUSCULAR at 19:53

## 2022-06-04 RX ADMIN — DOCUSATE SODIUM 100 MG: 100 CAPSULE, LIQUID FILLED ORAL at 19:53

## 2022-06-04 RX ADMIN — IBUPROFEN 600 MG: 600 TABLET ORAL at 08:46

## 2022-06-04 NOTE — PROGRESS NOTES
Progress Note - OB/GYN   Leonie Sweeney 36 y o  female MRN: [id number]  Unit/Bed#: -01 Encounter: [id number]      Assessment:  Post partum day #1 s/p , stable, and doing well    Plan:  1  Continue routine post partum care   - Patient is ambulating, encouraged to continue    - Encourage breastfeeding  2  Continue current meds    - See list below   - Pain well controlled  3  Future contraception   - Pt desires depo, ordered   4  Disposition   - Anticipate discharge home today vs tomorrow      Subjective/Objective       Subjective:   Pain: yes  Tolerating Oral Intake: yes  Voiding: yes  Flatus: yes  Bowel Movement: no  Ambulating: yes  Breastfeeding: Breastfeeding  Chest Pain: no  Shortness of Breath: no  Leg Pain/Discomfort: no    Objective:   Vitals:   BP 95/50 (BP Location: Right arm)   Pulse 79   Temp 97 8 °F (36 6 °C) (Oral)   Resp 18   Ht 5' 4" (1 626 m)   Wt 78 kg (172 lb)   LMP 2020 (LMP Unknown)   SpO2 98%   Breastfeeding Yes   BMI 29 52 kg/m²   Body mass index is 29 52 kg/m²    I/O       / 0701  /03 0700 /03 0701  06/04 0700    Urine (mL/kg/hr)  1850 (1)    Blood  179    Total Output  2029    Net  -              Lab Results   Component Value Date    WBC 9 55 2022    HGB 12 8 2022    HCT 40 2 2022    MCV 87 2022     2022       Meds/Allergies   Current Facility-Administered Medications   Medication Dose Route Frequency    acetaminophen (TYLENOL) tablet 650 mg  650 mg Oral Q4H PRN    benzocaine-menthol-lanolin-aloe (DERMOPLAST) 20-0 5 % topical spray 1 application  1 application Topical J7K PRN    calcium carbonate (TUMS) chewable tablet 1,000 mg  1,000 mg Oral Daily PRN    docusate sodium (COLACE) capsule 100 mg  100 mg Oral BID    hydrocortisone 1 % cream 1 application  1 application Topical Daily PRN    ibuprofen (MOTRIN) tablet 600 mg  600 mg Oral Q6H PRN    ondansetron (ZOFRAN) injection 4 mg  4 mg Intravenous Q8H PRN    senna (SENOKOT) tablet 8 6 mg  1 tablet Oral HS PRN    simethicone (MYLICON) chewable tablet 80 mg  80 mg Oral 4x Daily PRN    witch hazel-glycerin (TUCKS) topical pad 1 pad  1 pad Topical Q4H PRN       Physical Exam:  General: in no apparent distress  Cardiovascular: Cor RRR  Lungs: clear to auscultation bilaterally  Fundus: Firm, 1 cm below the umbilicus    Ricardo Doss MD  PGY-2 OB/GYN   6/4/2022 5:32 AM

## 2022-06-04 NOTE — PLAN OF CARE
Problem: PAIN - ADULT  Goal: Verbalizes/displays adequate comfort level or baseline comfort level  Description: Interventions:  - Encourage patient to monitor pain and request assistance  - Assess pain using appropriate pain scale  - Administer analgesics based on type and severity of pain and evaluate response  - Implement non-pharmacological measures as appropriate and evaluate response  - Consider cultural and social influences on pain and pain management  - Notify physician/advanced practitioner if interventions unsuccessful or patient reports new pain  Outcome: Progressing     Problem: INFECTION - ADULT  Goal: Absence or prevention of progression during hospitalization  Description: INTERVENTIONS:  - Assess and monitor for signs and symptoms of infection  - Monitor lab/diagnostic results  - Monitor all insertion sites, i e  indwelling lines, tubes, and drains  - Monitor endotracheal if appropriate and nasal secretions for changes in amount and color  - [place] appropriate cooling/warming therapies per order  - Administer medications as ordered  - Instruct and encourage patient and family to use good hand hygiene technique  - Identify and instruct in appropriate isolation precautions for identified infection/condition  Outcome: Progressing  Goal: Absence of fever/infection during neutropenic period  Description: INTERVENTIONS:  - Monitor WBC    Outcome: Progressing     Problem: SAFETY ADULT  Goal: Patient will remain free of falls  Description: INTERVENTIONS:  - Educate patient/family on patient safety including physical limitations  - Instruct patient to call for assistance with activity   - Consult OT/PT to assist with strengthening/mobility   - Keep Call bell within reach  - Keep bed low and locked with side rails adjusted as appropriate  - Keep care items and personal belongings within reach  - Initiate and maintain comfort rounds  - Make Fall Risk Sign visible to staff  - Apply yellow socks and bracelet for high fall risk patients  - Consider moving patient to room near nurses station  Outcome: Progressing  Goal: Maintain or return to baseline ADL function  Description: INTERVENTIONS:  -  Assess patient's ability to carry out ADLs; assess patient's baseline for ADL function and identify physical deficits which impact ability to perform ADLs (bathing, care of mouth/teeth, toileting, grooming, dressing, etc )  - Assess/evaluate cause of self-care deficits   - Assess range of motion  - Assess patient's mobility; develop plan if impaired  - Assess patient's need for assistive devices and provide as appropriate  - Encourage maximum independence but intervene and supervise when necessary  - Involve family in performance of ADLs  - Assess for home care needs following discharge   - Consider OT consult to assist with ADL evaluation and planning for discharge  - Provide patient education as appropriate  Outcome: Progressing  Goal: Maintains/Returns to pre admission functional level  Description: INTERVENTIONS:  - Perform BMAT or MOVE assessment daily    - Set and communicate daily mobility goal to care team and patient/family/caregiver  - Collaborate with rehabilitation services on mobility goals if consulted  - Out of bed for toileting  - Record patient progress and toleration of activity level   Outcome: Progressing     Problem: Knowledge Deficit  Goal: Patient/family/caregiver demonstrates understanding of disease process, treatment plan, medications, and discharge instructions  Description: Complete learning assessment and assess knowledge base    Interventions:  - Provide teaching at level of understanding  - Provide teaching via preferred learning methods  Outcome: Progressing     Problem: DISCHARGE PLANNING  Goal: Discharge to home or other facility with appropriate resources  Description: INTERVENTIONS:  - Identify barriers to discharge w/patient and caregiver  - Arrange for needed discharge resources and transportation as appropriate  - Identify discharge learning needs (meds, wound care, etc )  - Arrange for interpretive services to assist at discharge as needed  - Refer to Case Management Department for coordinating discharge planning if the patient needs post-hospital services based on physician/advanced practitioner order or complex needs related to functional status, cognitive ability, or social support system  Outcome: Progressing     Problem: Labor & Delivery  Goal: Patient vital signs are stable  Description: 1  Assess vital signs - vaginal delivery    Outcome: Progressing

## 2022-06-04 NOTE — LACTATION NOTE
This note was copied from a baby's chart  CONSULT - LACTATION  Baby Girl Patrick Oro 1 days female MRN: [id number]    [facility] NURSERY Room / Bed: (N)/(N) Encounter: [id number]    Maternal Information     MOTHER:  Kylah Schilling  Maternal Age: 36 y o    OB History: # 1 - Date: 20, Sex: Male, Weight: 3629 g (8 lb), GA: 40w0d, Delivery: Vaginal, Spontaneous, Apgar1: None, Apgar5: None, Living: Living, Birth Comments: None    # 2 - Date: 22, Sex: Female, Weight: 2830 g (6 lb 3 8 oz), GA: 38w4d, Delivery: Vaginal, Spontaneous, Apgar1: 9, Apgar5: 9, Living: Living, Birth Comments: None   Previouse breast reduction surgery? No    Lactation history:   Has patient previously breast fed: No   How long had patient previously breast fed:     Previous breast feeding complications:       Past Surgical History:   Procedure Laterality Date    NO PAST SURGERIES      TONSILLECTOMY          Birth information:  YOB: 2020   Time of birth: 12:54 PM   Sex: female   Delivery type: Vaginal, Spontaneous   Birth Weight: 2830 g (6 lb 3 8 oz)   Percent of Weight Change: -2%     Gestational Age: 38w3d   [unfilled]    Assessment     Breast and nipple assessment: cracked nipples, small crack noted on left nipple    [place] Assessment: normal assessment    Feeding assessment: feeding well  LATCH:  Latch: Repeated attempts, hold nipple in mouth, stimulate to suck   Audible Swallowing: Spontaneous and intermittent (24 hours old)   Type of Nipple: Everted (After stimulation)   Comfort (Breast/Nipple): Soft/non-tender   Hold (Positioning): Partial assist, teach one side, mother does other, staff holds   [facility] Score: 8          Feeding recommendations:  breast feed on demand     Language  [id number] Priti Sanchez ) used for translation  Met with Stephania Hanson and provided her with the Ready Set Baby and the Discharge Breastfeeding Booklets   Ready Set Baby information was reviewed and the Discharge Breastfeeding Booklet was left at bedside for mom to review ( written material was provided in Romansh )  Discussed Skin to Skin contact and benefits to mom and baby  Feeding cues and what that means for recognizing infant's hunger reviewed  Avoidance of pacifiers for the first month discussed  Talked about exclusive breastfeeding for the first 6 months  Positioning and latch reviewed as well as showing images of other feeding positions  Discussed the properties of a good latch in any position  Reviewed hand/manual expression  Helped mom work on positioning infant up at chest level ( using pillows for support as needed ), aligning  nose to nipple and dragging nipple down to chin to achieve a wide deep latch  Reminded mom to bring baby to breast and not breast to baby ( no hunching)  Then used areolar compression to achieve a deep latch that was comfortable and exchanged optimum amounts of colostrum  Stressed importance of good alignment ( baby's ear, shoulder and hip in a straight line )   Baby did maintain latch for 10 minutes  A small crack was noted on her left nipple from a shallow latch, mom was given some lanolin with instructions on use  Gave information on common concerns, what to expect the first few weeks after delivery, preparing for other caregivers, and how partners can help  Resources for support also provided  Encouraged mom to call nursing staff for assistance as needed          Haylee Gallegos RN 6/4/2022 7:57 PM normal...

## 2022-06-04 NOTE — PLAN OF CARE
Problem: PAIN - ADULT  Goal: Verbalizes/displays adequate comfort level or baseline comfort level  Description: Interventions:  - Encourage patient to monitor pain and request assistance  - Assess pain using appropriate pain scale  - Administer analgesics based on type and severity of pain and evaluate response  - Implement non-pharmacological measures as appropriate and evaluate response  - Consider cultural and social influences on pain and pain management  - Notify physician/advanced practitioner if interventions unsuccessful or patient reports new pain  Outcome: Progressing     Problem: INFECTION - ADULT  Goal: Absence or prevention of progression during hospitalization  Description: INTERVENTIONS:  - Assess and monitor for signs and symptoms of infection  - Monitor lab/diagnostic results  - Monitor all insertion sites, i e  indwelling lines, tubes, and drains  - Monitor endotracheal if appropriate and nasal secretions for changes in amount and color  - [place] appropriate cooling/warming therapies per order  - Administer medications as ordered  - Instruct and encourage patient and family to use good hand hygiene technique  - Identify and instruct in appropriate isolation precautions for identified infection/condition  Outcome: Progressing  Goal: Absence of fever/infection during neutropenic period  Description: INTERVENTIONS:  - Monitor WBC    Outcome: Progressing     Problem: SAFETY ADULT  Goal: Patient will remain free of falls  Description: INTERVENTIONS:  - Educate patient/family on patient safety including physical limitations  - Instruct patient to call for assistance with activity   - Consult OT/PT to assist with strengthening/mobility   - Keep Call bell within reach  - Keep bed low and locked with side rails adjusted as appropriate  - Keep care items and personal belongings within reach  - Initiate and maintain comfort rounds  - Make Fall Risk Sign visible to staff  - Apply yellow socks and bracelet for high fall risk patients  - Consider moving patient to room near nurses station  Outcome: Progressing  Goal: Maintain or return to baseline ADL function  Description: INTERVENTIONS:  -  Assess patient's ability to carry out ADLs; assess patient's baseline for ADL function and identify physical deficits which impact ability to perform ADLs (bathing, care of mouth/teeth, toileting, grooming, dressing, etc )  - Assess/evaluate cause of self-care deficits   - Assess range of motion  - Assess patient's mobility; develop plan if impaired  - Assess patient's need for assistive devices and provide as appropriate  - Encourage maximum independence but intervene and supervise when necessary  - Involve family in performance of ADLs  - Assess for home care needs following discharge   - Consider OT consult to assist with ADL evaluation and planning for discharge  - Provide patient education as appropriate  Outcome: Progressing  Goal: Maintains/Returns to pre admission functional level  Description: INTERVENTIONS:  - Perform BMAT or MOVE assessment daily    - Set and communicate daily mobility goal to care team and patient/family/caregiver  - Collaborate with rehabilitation services on mobility goals if consulted  - Out of bed for toileting  - Record patient progress and toleration of activity level   Outcome: Progressing     Problem: Knowledge Deficit  Goal: Patient/family/caregiver demonstrates understanding of disease process, treatment plan, medications, and discharge instructions  Description: Complete learning assessment and assess knowledge base    Interventions:  - Provide teaching at level of understanding  - Provide teaching via preferred learning methods  Outcome: Progressing     Problem: DISCHARGE PLANNING  Goal: Discharge to home or other facility with appropriate resources  Description: INTERVENTIONS:  - Identify barriers to discharge w/patient and caregiver  - Arrange for needed discharge resources and transportation as appropriate  - Identify discharge learning needs (meds, wound care, etc )  - Arrange for interpretive services to assist at discharge as needed  - Refer to Case Management Department for coordinating discharge planning if the patient needs post-hospital services based on physician/advanced practitioner order or complex needs related to functional status, cognitive ability, or social support system  Outcome: Progressing     Problem: Labor & Delivery  Goal: Patient vital signs are stable  Description: 1  Assess vital signs - vaginal delivery    Outcome: Progressing

## 2022-06-05 VITALS
BODY MASS INDEX: 29.37 KG/M2 | TEMPERATURE: 97.6 F | RESPIRATION RATE: 20 BRPM | SYSTOLIC BLOOD PRESSURE: 104 MMHG | WEIGHT: 172 LBS | HEART RATE: 89 BPM | OXYGEN SATURATION: 99 % | HEIGHT: 64 IN | DIASTOLIC BLOOD PRESSURE: 54 MMHG

## 2022-06-05 PROCEDURE — 99024 POSTOP FOLLOW-UP VISIT: CPT | Performed by: OBSTETRICS & GYNECOLOGY

## 2022-06-05 RX ORDER — ACETAMINOPHEN 325 MG/1
650 TABLET ORAL EVERY 4 HOURS PRN
Refills: 0
Start: 2022-06-05

## 2022-06-05 RX ORDER — IBUPROFEN 600 MG/1
600 TABLET ORAL EVERY 6 HOURS PRN
Qty: 30 TABLET | Refills: 0
Start: 2022-06-05

## 2022-06-05 RX ADMIN — IBUPROFEN 600 MG: 600 TABLET ORAL at 02:11

## 2022-06-05 RX ADMIN — IBUPROFEN 600 MG: 600 TABLET ORAL at 08:33

## 2022-06-05 RX ADMIN — ACETAMINOPHEN 650 MG: 325 TABLET ORAL at 08:33

## 2022-06-05 RX ADMIN — DOCUSATE SODIUM 100 MG: 100 CAPSULE, LIQUID FILLED ORAL at 08:33

## 2022-06-05 NOTE — PLAN OF CARE
Problem: PAIN - ADULT  Goal: Verbalizes/displays adequate comfort level or baseline comfort level  Description: Interventions:  - Encourage patient to monitor pain and request assistance  - Assess pain using appropriate pain scale  - Administer analgesics based on type and severity of pain and evaluate response  - Implement non-pharmacological measures as appropriate and evaluate response  - Consider cultural and social influences on pain and pain management  - Notify physician/advanced practitioner if interventions unsuccessful or patient reports new pain  Outcome: Adequate for Discharge     Problem: SAFETY ADULT  Goal: Maintain or return to baseline ADL function  Description: INTERVENTIONS:  -  Assess patient's ability to carry out ADLs; assess patient's baseline for ADL function and identify physical deficits which impact ability to perform ADLs (bathing, care of mouth/teeth, toileting, grooming, dressing, etc )  - Assess/evaluate cause of self-care deficits   - Assess range of motion  - Assess patient's mobility; develop plan if impaired  - Assess patient's need for assistive devices and provide as appropriate  - Encourage maximum independence but intervene and supervise when necessary  - Involve family in performance of ADLs  - Assess for home care needs following discharge   - Consider OT consult to assist with ADL evaluation and planning for discharge  - Provide patient education as appropriate  Outcome: Adequate for Discharge  Goal: Maintains/Returns to pre admission functional level  Description: INTERVENTIONS:  - Perform BMAT or MOVE assessment daily    - Set and communicate daily mobility goal to care team and patient/family/caregiver     - Out of bed for toileting  - Record patient progress and toleration of activity level   Outcome: Adequate for Discharge     Problem: POSTPARTUM  Goal: Establishment of infant feeding pattern  Description: INTERVENTIONS:  - Assess breast/bottle feeding  - Refer to lactation as needed  Outcome: Adequate for Discharge     Problem: INFECTION - ADULT  Goal: Absence or prevention of progression during hospitalization  Description: INTERVENTIONS:  - Assess and monitor for signs and symptoms of infection  - Monitor lab/diagnostic results  - Monitor all insertion sites, i e  indwelling lines, tubes, and drains  - Monitor endotracheal if appropriate and nasal secretions for changes in amount and color  - [place] appropriate cooling/warming therapies per order  - Administer medications as ordered  - Instruct and encourage patient and family to use good hand hygiene technique  - Identify and instruct in appropriate isolation precautions for identified infection/condition  Outcome: Completed  Goal: Absence of fever/infection during neutropenic period  Description: INTERVENTIONS:  - Monitor WBC    Outcome: Completed     Problem: SAFETY ADULT  Goal: Patient will remain free of falls  Description: INTERVENTIONS:  - Educate patient/family on patient safety including physical limitations  - Instruct patient to call for assistance with activity   - Consult OT/PT to assist with strengthening/mobility   - Keep Call bell within reach  - Keep bed low and locked with side rails adjusted as appropriate  - Keep care items and personal belongings within reach  - Initiate and maintain comfort rounds  - Apply yellow socks and bracelet for high fall risk patients  - Consider moving patient to room near nurses station  Outcome: Completed     Problem: DISCHARGE PLANNING  Goal: Discharge to home or other facility with appropriate resources  Description: INTERVENTIONS:  - Identify barriers to discharge w/patient and caregiver  - Arrange for needed discharge resources and transportation as appropriate  - Identify discharge learning needs (meds, wound care, etc )  - Arrange for interpretive services to assist at discharge as needed  - Refer to Case Management Department for coordinating discharge planning if the patient needs post-hospital services based on physician/advanced practitioner order or complex needs related to functional status, cognitive ability, or social support system  Outcome: Completed     Problem: POSTPARTUM  Goal: Experiences normal postpartum course  Description: INTERVENTIONS:  - Monitor maternal vital signs  - Assess uterine involution and lochia  Outcome: Completed  Goal: Appropriate maternal -  bonding  Description: INTERVENTIONS:  - Identify family support  - Assess for appropriate maternal/infant bonding   -Encourage maternal/infant bonding opportunities  - Referral to  or  as needed  Outcome: Completed  Goal: Incision(s), wounds(s) or drain site(s) healing without S/S of infection  Description: INTERVENTIONS  - Assess and document dressing, incision, wound bed, drain sites and surrounding tissue  - Provide patient and family education  Outcome: Completed

## 2022-06-05 NOTE — PLAN OF CARE
Problem: PAIN - ADULT  Goal: Verbalizes/displays adequate comfort level or baseline comfort level  Description: Interventions:  - Encourage patient to monitor pain and request assistance  - Assess pain using appropriate pain scale  - Administer analgesics based on type and severity of pain and evaluate response  - Implement non-pharmacological measures as appropriate and evaluate response  - Consider cultural and social influences on pain and pain management  - Notify physician/advanced practitioner if interventions unsuccessful or patient reports new pain  6/4/2022 2048 by Jessica Yanes RN  Outcome: Progressing  6/4/2022 2046 by Jessica Yanes RN  Outcome: Progressing     Problem: INFECTION - ADULT  Goal: Absence or prevention of progression during hospitalization  Description: INTERVENTIONS:  - Assess and monitor for signs and symptoms of infection  - Monitor lab/diagnostic results  - Monitor all insertion sites, i e  indwelling lines, tubes, and drains  - Monitor endotracheal if appropriate and nasal secretions for changes in amount and color  - [place] appropriate cooling/warming therapies per order  - Administer medications as ordered  - Instruct and encourage patient and family to use good hand hygiene technique  - Identify and instruct in appropriate isolation precautions for identified infection/condition  6/4/2022 2048 by Jessica Yanes RN  Outcome: Progressing  6/4/2022 2046 by Jessica Yanes RN  Outcome: Progressing  Goal: Absence of fever/infection during neutropenic period  Description: INTERVENTIONS:  - Monitor WBC    6/4/2022 2048 by Jessica Yanes RN  Outcome: Progressing  6/4/2022 2046 by Jessica Yanes RN  Outcome: Progressing     Problem: SAFETY ADULT  Goal: Patient will remain free of falls  Description: INTERVENTIONS:  - Educate patient/family on patient safety including physical limitations  - Instruct patient to call for assistance with activity   - Consult OT/PT to assist with strengthening/mobility   - Keep Call bell within reach  - Keep bed low and locked with side rails adjusted as appropriate  - Keep care items and personal belongings within reach  - Initiate and maintain comfort rounds  - Make Fall Risk Sign visible to staff  - Offer Toileting every  Hours, in advance of need  - Initiate/Maintain alarm  - Obtain necessary fall risk management equipment:   - Apply yellow socks and bracelet for high fall risk patients  - Consider moving patient to room near nurses station  6/4/2022 2048 by Yann Rodriguez RN  Outcome: Progressing  6/4/2022 2046 by Yann Rodriguez RN  Outcome: Progressing  Goal: Maintain or return to baseline ADL function  Description: INTERVENTIONS:  -  Assess patient's ability to carry out ADLs; assess patient's baseline for ADL function and identify physical deficits which impact ability to perform ADLs (bathing, care of mouth/teeth, toileting, grooming, dressing, etc )  - Assess/evaluate cause of self-care deficits   - Assess range of motion  - Assess patient's mobility; develop plan if impaired  - Assess patient's need for assistive devices and provide as appropriate  - Encourage maximum independence but intervene and supervise when necessary  - Involve family in performance of ADLs  - Assess for home care needs following discharge   - Consider OT consult to assist with ADL evaluation and planning for discharge  - Provide patient education as appropriate  6/4/2022 2048 by Yann Rodriguez RN  Outcome: Progressing  6/4/2022 2046 by Yann Rodriguez RN  Outcome: Progressing  Goal: Maintains/Returns to pre admission functional level  Description: INTERVENTIONS:  - Perform BMAT or MOVE assessment daily    - Set and communicate daily mobility goal to care team and patient/family/caregiver  - Collaborate with rehabilitation services on mobility goals if consulted  - Perform Range of Motion  times a day  - Reposition patient every  hours    - Dangle patient  times a day  - Stand patient  times a day  - Ambulate patient  times a day  - Out of bed to chair  times a day   - Out of bed for meals  times a day  - Out of bed for toileting  - Record patient progress and toleration of activity level   2022 by Margie Lawson RN  Outcome: Progressing  2022 by Margie Lawson RN  Outcome: Progressing     Problem: DISCHARGE PLANNING  Goal: Discharge to home or other facility with appropriate resources  Description: INTERVENTIONS:  - Identify barriers to discharge w/patient and caregiver  - Arrange for needed discharge resources and transportation as appropriate  - Identify discharge learning needs (meds, wound care, etc )  - Arrange for interpretive services to assist at discharge as needed  - Refer to Case Management Department for coordinating discharge planning if the patient needs post-hospital services based on physician/advanced practitioner order or complex needs related to functional status, cognitive ability, or social support system  2022 by Margie Lawson RN  Outcome: Progressing  2022 by Margie Lawson RN  Outcome: Progressing     Problem: POSTPARTUM  Goal: Experiences normal postpartum course  Description: INTERVENTIONS:  - Monitor maternal vital signs  - Assess uterine involution and lochia  Outcome: Progressing  Goal: Appropriate maternal -  bonding  Description: INTERVENTIONS:  - Identify family support  - Assess for appropriate maternal/infant bonding   -Encourage maternal/infant bonding opportunities  - Referral to  or  as needed  Outcome: Progressing  Goal: Establishment of infant feeding pattern  Description: INTERVENTIONS:  - Assess breast/bottle feeding  - Refer to lactation as needed  Outcome: Progressing  Goal: Incision(s), wounds(s) or drain site(s) healing without S/S of infection  Description: INTERVENTIONS  - Assess and document dressing, incision, wound bed, drain sites and surrounding tissue  - Provide patient and family education  - Perform skin care/dressing changes every  Outcome: Progressing

## 2022-06-05 NOTE — LACTATION NOTE
This note was copied from a baby's chart  Language  # [id number] Sarah Arteaga) used for translation  Met with Behzadmora Singleton who is being discharged to home with her baby girl today  Behzad Singleton has been placing baby to the breast and finger feeding her expressed breast milk with a syringe  She states that baby latches but only sucks for a few minutes and falls asleep  Feeding Plan:  1) introduce breast first for every feeding  2) to feed infant expressed breast milk after breast   3)  to pump after every feeding at the breast     Went over the information in the Discharge Breastfeeding Booklet including the feeding log  Emphasized 8 or more (12) feedings in a 24 hour period and what to expect for the number of diapers per day of life  Stressed importance of monitoring babies output closely and calling Pediatrician if output is not adequate  Discussed s/s engorgement, blocked milk ducts, and mastitis  Discussed how to remedy at home and when to contact physician  Reviewed breastfeeding and your lifestyle, storage and preparation of breast milk, how to keep you breast pump clean, 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

## 2022-06-05 NOTE — PROGRESS NOTES
Progress Note - OB/GYN   Maynor Spaulding 36 y o  female MRN: [id number]  Unit/Bed#: -01 Encounter: [id number]      Assessment:  Post partum day #2 s/p , stable, and doing well    Plan:  1  Continue routine post partum care   - Patient is ambulating, encouraged to continue    - Encourage breastfeeding  2  Continue current meds    - See list below   - Pain well controlled  3  Future contraception   - Pt desires depo, ordered   4  Disposition   - Anticipate discharge home today      Subjective/Objective       Subjective:   Pain: yes  Tolerating Oral Intake: yes  Voiding: yes  Flatus: yes  Bowel Movement: no  Ambulating: yes  Breastfeeding: Breastfeeding  Chest Pain: no  Shortness of Breath: no  Leg Pain/Discomfort: no    Objective:   Vitals:   /59 (BP Location: Left arm)   Pulse 86   Temp 97 5 °F (36 4 °C) (Oral)   Resp 20   Ht 5' 4" (1 626 m)   Wt 78 kg (172 lb)   LMP 2020 (LMP Unknown)   SpO2 99%   Breastfeeding Yes   BMI 29 52 kg/m²   Body mass index is 29 52 kg/m²    I/O       / 0701  /03 0700 /03 0701  06/04 0700    Urine (mL/kg/hr)  1850 (1)    Blood  179    Total Output  2029    Net  -              Lab Results   Component Value Date    WBC 9 55 2022    HGB 12 8 2022    HCT 40 2 2022    MCV 87 2022     2022       Meds/Allergies   Current Facility-Administered Medications   Medication Dose Route Frequency    acetaminophen (TYLENOL) tablet 650 mg  650 mg Oral Q4H PRN    benzocaine-menthol-lanolin-aloe (DERMOPLAST) 20-0 5 % topical spray 1 application  1 application Topical M5Y PRN    calcium carbonate (TUMS) chewable tablet 1,000 mg  1,000 mg Oral Daily PRN    docusate sodium (COLACE) capsule 100 mg  100 mg Oral BID    hydrocortisone 1 % cream 1 application  1 application Topical Daily PRN    ibuprofen (MOTRIN) tablet 600 mg  600 mg Oral Q6H [facility]    medroxyPROGESTERone (DEPO-PROVERA) IM injection 150 mg  150 mg Intramuscular Once    ondansetron (ZOFRAN) injection 4 mg  4 mg Intravenous Q8H PRN    senna (SENOKOT) tablet 8 6 mg  1 tablet Oral HS PRN    simethicone (MYLICON) chewable tablet 80 mg  80 mg Oral 4x Daily PRN    witch hazel-glycerin (TUCKS) topical pad 1 pad  1 pad Topical Q4H PRN       Physical Exam:  General: in no apparent distress  Cardiovascular: Cor RRR  Lungs: clear to auscultation bilaterally  Fundus: Firm, 1 cm below the umbilicus    Liz Acuna MD  PGY-2 OB/GYN   6/5/2022 7:25 AM      Seen and agree with Dr Katie Christie note  Good recovery to date  Continue current postpartum  care plan   Discharge to home today PPD 2    Yee Webster MD

## 2022-06-06 LAB — RPR SER QL: NORMAL

## 2022-06-07 ENCOUNTER — TELEPHONE (OUTPATIENT)
Dept: OBGYN CLINIC | Facility: CLINIC | Age: 32
End: 2022-06-07

## 2022-06-07 NOTE — TELEPHONE ENCOUNTER
Transition of Care Post Partum phone call: Congratulations      Date of delivery: Marina 3, 2022  Weeks gestation: full term 38 weeks  Type of delivery: vaginal delivery  Sex of child: female  Name of child:   Birth weight: 2830 gm   6lbs 3 8 ounces  Perineum laceration: Yes   How are you feeling: good  Vaginal bleeding status: moderate  Urinary status: none  Bowel movement: Yes  Incision status: NA   Pain control: ibuprofen (OTC)   feeding: Breastfeeding   Any baby blues: No  Scheduled for follow-up appointment: 2022

## 2022-06-09 LAB — PLACENTA IN STORAGE: NORMAL

## 2022-06-30 ENCOUNTER — POSTPARTUM VISIT (OUTPATIENT)
Dept: OBGYN CLINIC | Facility: CLINIC | Age: 32
End: 2022-06-30

## 2022-06-30 VITALS
HEART RATE: 97 BPM | SYSTOLIC BLOOD PRESSURE: 104 MMHG | BODY MASS INDEX: 27.86 KG/M2 | WEIGHT: 163.2 LBS | DIASTOLIC BLOOD PRESSURE: 69 MMHG | HEIGHT: 64 IN

## 2022-06-30 DIAGNOSIS — Z30.013 ENCOUNTER FOR INITIAL PRESCRIPTION OF INJECTABLE CONTRACEPTIVE: Primary | ICD-10-CM

## 2022-06-30 PROCEDURE — 99213 OFFICE O/P EST LOW 20 MIN: CPT | Performed by: OBSTETRICS & GYNECOLOGY

## 2022-06-30 PROCEDURE — 96372 THER/PROPH/DIAG INJ SC/IM: CPT | Performed by: OBSTETRICS & GYNECOLOGY

## 2022-06-30 RX ORDER — MEDROXYPROGESTERONE ACETATE 150 MG/ML
150 INJECTION, SUSPENSION INTRAMUSCULAR
Qty: 1 ML | Refills: 4 | Status: SHIPPED | OUTPATIENT
Start: 2022-06-30

## 2022-06-30 RX ORDER — MEDROXYPROGESTERONE ACETATE 150 MG/ML
150 INJECTION, SUSPENSION INTRAMUSCULAR
Status: SHIPPED | OUTPATIENT
Start: 2022-06-30

## 2022-06-30 RX ADMIN — MEDROXYPROGESTERONE ACETATE 150 MG: 150 INJECTION, SUSPENSION INTRAMUSCULAR at 11:15

## 2022-06-30 NOTE — PROGRESS NOTES
Assessment/Plan     Normal postpartum exam      1  Contraception: Desires Depo Provera, Rx sent and patient to RTC today for injection   2  Increase activity as tolerated, may resume all normal activity  3  Anticipated return to work: 6 - 12 weeks post partum  4  Last pap was NILM 22, next due in 2025  5  RTC in 1 year for an annual      [address]  Ghulam Burgos is a 36 y o  female who presents for a postpartum visit  Her pregnancy was uncomplicated  She delivered via  and has a first degree repair on 6/3/22, 6 lb 3 8oz, female   Her delivery course was uncomplicated  She reports that she has had a small amount of vaginal bleeding since delivery, but no large blood clots  Baby is doing well and she is exclusively breastfeeding  Postpartum depression screening: negative  EPDS : 3            Current Outpatient Medications:     acetaminophen (TYLENOL) 325 mg tablet, Take 2 tablets (650 mg total) by mouth every 4 (four) hours as needed for mild pain, Disp: , Rfl: 0    fluticasone (FLONASE) 50 mcg/act nasal spray, Week 1  administer 2 sprays in each nostril per day  Week 2 administer  1 spray in each nostril daily  (Patient taking differently: Week 1  administer 2 sprays in each nostril per day   Week 2 administer  1 spray in each nostril daily ), Disp: 16 g, Rfl: 3    ibuprofen (MOTRIN) 600 mg tablet, Take 1 tablet (600 mg total) by mouth every 6 (six) hours as needed for moderate pain, Disp: 30 tablet, Rfl: 0    medroxyPROGESTERone (DEPO-PROVERA) 150 mg/mL injection, Inject 1 mL (150 mg total) into a muscle every 3 (three) months, Disp: 1 mL, Rfl: 4    Prenatal Vit-Fe Fumarate-FA (PRENATAL PO), Take by mouth, Disp: , Rfl:     docusate sodium (COLACE) 100 mg capsule, Take 1 capsule (100 mg total) by mouth 2 (two) times a day (Patient not taking: No sig reported), Disp: 60 capsule, Rfl: 6    Current Facility-Administered Medications:     medroxyPROGESTERone (DEPO-PROVERA) IM injection 150 mg, 150 mg, Intramuscular, Q3 Months, Jeannette Flores MD, 150 mg at 06/30/22 1115    No Known Allergies    Review of Systems  Constitutional: no fever, feels well  Breasts: no complaints of breast pain, breast lump, or nipple discharge  Gastrointestinal: no complaints nausea, vomiting  Genitourinary: as noted in HPI  Neurological: no complaints of headache    Objective      /69   Pulse 97   Ht 5' 4" (1 626 m)   Wt 74 kg (163 lb 3 2 oz)   LMP 02/22/2020 (LMP Unknown)   BMI 28 01 kg/m²     Physical Exam  Constitutional:       General: She is not in acute distress  Appearance: She is not ill-appearing or toxic-appearing  HENT:      Head: Normocephalic and atraumatic  Pulmonary:      Effort: Pulmonary effort is normal  No respiratory distress  Neurological:      General: No focal deficit present  Mental Status: She is alert and oriented to person, place, and time  Mental status is at baseline  Skin:     General: Skin is warm and dry  Psychiatric:         Mood and Affect: Mood normal          Thought Content:  Thought content normal          Judgment: Judgment normal

## 2022-07-13 ENCOUNTER — PATIENT OUTREACH (OUTPATIENT)
Dept: OBGYN CLINIC | Facility: CLINIC | Age: 32
End: 2022-07-13

## 2022-07-13 DIAGNOSIS — Z3A.38 38 WEEKS GESTATION OF PREGNANCY: Primary | ICD-10-CM

## 2022-07-13 NOTE — PROGRESS NOTES
Assessment/Plan:    No problem-specific Assessment & Plan notes found for this encounter  Diagnoses and all orders for this visit:    Yeast infection of the skin  -     fluconazole (DIFLUCAN) 150 mg tablet; Take 1 tablet (150 mg total) by mouth once for 1 dose  -     clotrimazole-betamethasone (LOTRISONE) 1-0 05 % cream; Apply topically 2 (two) times a day for 14 days Use sparingly    Symptoms in breast  -     clotrimazole-betamethasone (LOTRISONE) 1-0 05 % cream; Apply topically 2 (two) times a day for 14 days Use sparingly  Recommend to wash breasts gently prior to nursing and apply cream after nursing  RTO in 2 weeks to check symptoms  To call if symptoms worsen or has concerns  Language barrier  -     Ambulatory referral to social work care management program; Future          Subjective:      Patient ID: Silvia Harper is a 36 y o  female  HPI 63-year-old  002 here with a breast rash  Irish interpretation done by Zulema Galvin  She states it started  after her Depo injection  Noticed a few days after, started out like a pimple and itches  Has had similar symptoms that began during her pregnancy but located on her rib area  She is breastfeeding  Denies any new products, soaps, creams  Rash is getting worse  She has used hydrocortisone in her neck  area , reports had redness there and it helped  She last delivered 2022, 6wk PP  She had an  with a first-degree repair  She is breastfeeding but not breast feeding much due to her fear that baby will catch the rash  Patient was seen for her normal postpartum exam 2022  Depo for contraception received 2022  Pt was seen by dermatology  for 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 on areas of breast and left upper quadrant, flat papules with white scale- was to use aquaphor       The following portions of the patient's history were reviewed and updated as appropriate: allergies, current medications, past family history, past medical history, past social history, past surgical history and problem list     Review of Systems   Constitutional: Negative for chills and fever  Respiratory: Negative  Cardiovascular: Negative  Genitourinary: Negative for pelvic pain  Skin: Positive for rash  Located on her breast         Objective:      /72 (BP Location: Right arm, Patient Position: Sitting, Cuff Size: Standard)   Pulse 94   Resp 16   Wt 75 8 kg (167 lb)   LMP 02/22/2020 (LMP Unknown)   BMI 28 67 kg/m²          Physical Exam  Constitutional:       General: She is not in acute distress  Appearance: Normal appearance  She is not ill-appearing  Cardiovascular:      Rate and Rhythm: Normal rate  Pulmonary:      Effort: Pulmonary effort is normal    Neurological:      Mental Status: She is oriented to person, place, and time  Psychiatric:         Mood and Affect: Mood normal          Behavior: Behavior normal        Bilateral breast exam- no masses palpated but breasts with very  erythemic areas that is demarcated,appears yeast like  Skin on [place] affected and some areas are distorted  Nipples appear normal  She reports itching but no pain  Rt upper abdomen with areas that are more circular patches with whiteness

## 2022-07-14 ENCOUNTER — OFFICE VISIT (OUTPATIENT)
Dept: OBGYN CLINIC | Facility: CLINIC | Age: 32
End: 2022-07-14

## 2022-07-14 VITALS
BODY MASS INDEX: 28.67 KG/M2 | WEIGHT: 167 LBS | HEART RATE: 94 BPM | SYSTOLIC BLOOD PRESSURE: 106 MMHG | RESPIRATION RATE: 16 BRPM | DIASTOLIC BLOOD PRESSURE: 72 MMHG

## 2022-07-14 DIAGNOSIS — B37.2 YEAST INFECTION OF THE SKIN: Primary | ICD-10-CM

## 2022-07-14 DIAGNOSIS — Z78.9 LANGUAGE BARRIER: ICD-10-CM

## 2022-07-14 DIAGNOSIS — N64.59 SYMPTOMS IN BREAST: ICD-10-CM

## 2022-07-14 PROCEDURE — 99213 OFFICE O/P EST LOW 20 MIN: CPT | Performed by: NURSE PRACTITIONER

## 2022-07-14 RX ORDER — FLUCONAZOLE 150 MG/1
150 TABLET ORAL ONCE
Qty: 1 TABLET | Refills: 0 | Status: SHIPPED | OUTPATIENT
Start: 2022-07-14 | End: 2022-07-14

## 2022-07-14 RX ORDER — CLOTRIMAZOLE AND BETAMETHASONE DIPROPIONATE 10; .64 MG/G; MG/G
CREAM TOPICAL 2 TIMES DAILY
Qty: 30 G | Refills: 0 | Status: SHIPPED | OUTPATIENT
Start: 2022-07-14 | End: 2022-07-28 | Stop reason: SDUPTHER

## 2022-07-15 PROBLEM — O99.810 ABNORMAL GLUCOSE AFFECTING PREGNANCY: Status: RESOLVED | Noted: 2022-04-12 | Resolved: 2022-07-15

## 2022-07-15 PROBLEM — Z34.93 THIRD TRIMESTER PREGNANCY: Status: RESOLVED | Noted: 2022-03-01 | Resolved: 2022-07-15

## 2022-07-15 PROBLEM — R82.71 GBS BACTERIURIA: Status: RESOLVED | Noted: 2022-02-07 | Resolved: 2022-07-15

## 2022-07-15 PROBLEM — N64.59 SYMPTOMS IN BREAST: Status: ACTIVE | Noted: 2022-07-15

## 2022-07-15 PROBLEM — Z3A.38 38 WEEKS GESTATION OF PREGNANCY: Chronic | Status: RESOLVED | Noted: 2022-01-06 | Resolved: 2022-07-15

## 2022-07-15 PROBLEM — B37.2 YEAST INFECTION OF THE SKIN: Status: ACTIVE | Noted: 2022-07-15

## 2022-07-27 NOTE — PROGRESS NOTES
Assessment/Plan:    No problem-specific Assessment & Plan notes found for this encounter  Return to office 1 next Depo due, last Depo was 2022     Diagnoses and all orders for this visit:    Referred to Dermatology for Evaluation    Language barrier    Yeast infection of the skin  -     clotrimazole-betamethasone (LOTRISONE) 1-0 05 % cream; Apply topically 2 (two) times a day for 14 days Use sparingly  Recommend to apply very sparingly  Symptoms in breast  -     clotrimazole-betamethasone (LOTRISONE) 1-0 05 % cream; Apply topically 2 (two) times a day for 14 days Use sparingly    Other orders  -     fluconazole (DIFLUCAN) 150 mg tablet; Take 150 mg by mouth once          Subjective:      Patient ID: Krishna Steven is a 36 y o  female  HPI Kiswahili Interpretor used [id number]     35 yo  here for follow up of a breast rash that was itchy and she was seen for 2 weeks ago and prescribed Lotrisone to use b i d  X2 weeks  She reports the symptoms have improved just has minimal itching currently but rash is still somewhat present  She also had areas on her right upper abdomen that were more circular patches with whiteness  Patient previously seen by dermatology 10/69/9929 for 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 on areas of left upper quadrant and areas of breast     Patient delivered,  2022, received Depo-Provera for contraception 2022    The following portions of the patient's history were reviewed and updated as appropriate: allergies, current medications, past family history, past medical history, past social history, past surgical history and problem list     Review of Systems   Constitutional: Negative for chills and fever  Respiratory: Negative  Cardiovascular: Negative  Skin: Positive for rash  Rash on breasts and itching, patient reports has improved   Neurological: Negative for dizziness and headaches           Objective:      BP 97/67 (BP Location: Right arm, Patient Position: Sitting, Cuff Size: Adult)   Pulse 93   Resp 18   Ht 5' 4" (1 626 m)   Wt 75 8 kg (167 lb)   BMI 28 67 kg/m²          Physical Exam  Constitutional:       Appearance: Normal appearance  Cardiovascular:      Rate and Rhythm: Normal rate  Pulmonary:      Effort: Pulmonary effort is normal    Skin:     General: Skin is warm and dry  Neurological:      Mental Status: She is oriented to person, place, and time  Psychiatric:         Behavior: Behavior normal        bilateral breast exam-no masses present, leaking milk from her nipples    Skin appears to be less erythemic and areola appear more within normal, but erythema rash is still present-recommend for her to follow up in Dermatology for evaluation

## 2022-07-28 ENCOUNTER — OFFICE VISIT (OUTPATIENT)
Dept: OBGYN CLINIC | Facility: CLINIC | Age: 32
End: 2022-07-28

## 2022-07-28 VITALS
BODY MASS INDEX: 28.51 KG/M2 | HEIGHT: 64 IN | HEART RATE: 93 BPM | DIASTOLIC BLOOD PRESSURE: 67 MMHG | SYSTOLIC BLOOD PRESSURE: 97 MMHG | RESPIRATION RATE: 18 BRPM | WEIGHT: 167 LBS

## 2022-07-28 DIAGNOSIS — B37.2 YEAST INFECTION OF THE SKIN: ICD-10-CM

## 2022-07-28 DIAGNOSIS — Z78.9 LANGUAGE BARRIER: Primary | ICD-10-CM

## 2022-07-28 DIAGNOSIS — N64.59 SYMPTOMS IN BREAST: ICD-10-CM

## 2022-07-28 PROCEDURE — 99213 OFFICE O/P EST LOW 20 MIN: CPT | Performed by: NURSE PRACTITIONER

## 2022-07-28 RX ORDER — FLUCONAZOLE 150 MG/1
150 TABLET ORAL ONCE
COMMUNITY
Start: 2022-07-14

## 2022-07-28 RX ORDER — CLOTRIMAZOLE AND BETAMETHASONE DIPROPIONATE 10; .64 MG/G; MG/G
CREAM TOPICAL 2 TIMES DAILY
Qty: 30 G | Refills: 0 | Status: SHIPPED | OUTPATIENT
Start: 2022-07-28 | End: 2022-08-11

## 2022-09-22 ENCOUNTER — CLINICAL SUPPORT (OUTPATIENT)
Dept: OBGYN CLINIC | Facility: CLINIC | Age: 32
End: 2022-09-22

## 2022-09-22 DIAGNOSIS — Z30.42 ENCOUNTER FOR DEPO-PROVERA CONTRACEPTION: Primary | ICD-10-CM

## 2022-09-22 LAB — SL AMB POCT URINE HCG: NEGATIVE

## 2022-09-22 PROCEDURE — 96372 THER/PROPH/DIAG INJ SC/IM: CPT

## 2022-09-22 PROCEDURE — 81025 URINE PREGNANCY TEST: CPT

## 2022-09-22 RX ADMIN — MEDROXYPROGESTERONE ACETATE 150 MG: 150 INJECTION, SUSPENSION INTRAMUSCULAR at 08:03

## 2022-09-22 NOTE — PROGRESS NOTES
Depo-Provera      [x]   Patient provided box yes   o 3 Refills remain  o Refills submitted no   Last  Annual Date / Birth control check : due in January 2023   Last Depo date: 06/30/2022   Side effects: no   HCG: yes  o if applicable: negative   Given by: ev   Site: left deltoid     o Calcium supplement daily teaching  o Condoms for 2 weeks following first injection dose

## 2022-12-15 ENCOUNTER — CLINICAL SUPPORT (OUTPATIENT)
Dept: OBGYN CLINIC | Facility: CLINIC | Age: 32
End: 2022-12-15

## 2022-12-15 DIAGNOSIS — Z30.42 ENCOUNTER FOR DEPO-PROVERA CONTRACEPTION: ICD-10-CM

## 2022-12-15 RX ADMIN — MEDROXYPROGESTERONE ACETATE 150 MG: 150 INJECTION, SUSPENSION INTRAMUSCULAR at 08:13

## 2022-12-15 NOTE — PROGRESS NOTES
Depo-Provera     • [x]   Patient provided box    o 2 Refills remain  o Refills submitted no  • Last  Annual Date / Birth control check : pn H&P 01/27/22  • Last Depo date: 09/22/22  • Side effects: no  • HCG: no    • Given by: Ashley Reyes MA  • Site: Right Deltoid    o Calcium supplement daily teaching  o Condoms for 2 weeks following first injection dose

## 2023-03-02 ENCOUNTER — CLINICAL SUPPORT (OUTPATIENT)
Dept: OBGYN CLINIC | Facility: CLINIC | Age: 33
End: 2023-03-02

## 2023-03-02 DIAGNOSIS — Z30.42 ENCOUNTER FOR MANAGEMENT AND INJECTION OF DEPO-PROVERA: ICD-10-CM

## 2023-03-02 RX ADMIN — MEDROXYPROGESTERONE ACETATE 150 MG: 150 INJECTION, SUSPENSION INTRAMUSCULAR at 08:10

## 2023-03-02 NOTE — PROGRESS NOTES
Depo-Provera     • [x]   Patient provided box YES   o 1 Refills remain  o Refills submitted NO  • Last  Annual Date / Birth control check : 01/27/22  • Last Depo date: 12/15/22  • Side effects: NO  • HCG: NO  • Given by: Coco Henry  • Site: right deltoid     o Calcium supplement daily teaching  o Condoms for 2 weeks following first injection dose

## 2023-05-23 ENCOUNTER — CLINICAL SUPPORT (OUTPATIENT)
Dept: OBGYN CLINIC | Facility: CLINIC | Age: 33
End: 2023-05-23

## 2023-05-23 ENCOUNTER — TELEPHONE (OUTPATIENT)
Dept: OBGYN CLINIC | Facility: CLINIC | Age: 33
End: 2023-05-23

## 2023-05-23 DIAGNOSIS — Z30.42 ENCOUNTER FOR MANAGEMENT AND INJECTION OF DEPO-PROVERA: ICD-10-CM

## 2023-05-23 DIAGNOSIS — Z30.013 ENCOUNTER FOR INITIAL PRESCRIPTION OF INJECTABLE CONTRACEPTIVE: ICD-10-CM

## 2023-05-23 RX ORDER — MEDROXYPROGESTERONE ACETATE 150 MG/ML
150 INJECTION, SUSPENSION INTRAMUSCULAR
Qty: 1 ML | Refills: 1 | Status: SHIPPED | OUTPATIENT
Start: 2023-05-23

## 2023-05-23 RX ADMIN — MEDROXYPROGESTERONE ACETATE 150 MG: 150 INJECTION, SUSPENSION INTRAMUSCULAR at 09:08

## 2023-05-23 NOTE — PROGRESS NOTES
Depo-Provera     • [x]   Patient provided box YES   o 0 Refills remain  o Refills submitted NO   • Last  Annual Date / Birth control check : 1/27/2022  • Last Depo date: 3/2/23  • Side effects: NO   • HCG: NO   • Given by: Antwan Price  •   • Site: RIGHT DELTOID     o Calcium supplement daily teaching  o Condoms for 2 weeks following first injection dose

## 2023-08-15 ENCOUNTER — CLINICAL SUPPORT (OUTPATIENT)
Dept: OBGYN CLINIC | Facility: CLINIC | Age: 33
End: 2023-08-15

## 2023-08-15 DIAGNOSIS — Z30.42 ENCOUNTER FOR DEPO-PROVERA CONTRACEPTION: ICD-10-CM

## 2023-08-15 PROCEDURE — 96372 THER/PROPH/DIAG INJ SC/IM: CPT

## 2023-08-15 RX ADMIN — MEDROXYPROGESTERONE ACETATE 150 MG: 150 INJECTION, SUSPENSION INTRAMUSCULAR at 11:14

## 2023-08-15 NOTE — PROGRESS NOTES
Depo-Provera     • [x]   Patient provided box yes   o 1 Refills remain  o Refills submitted no  • Last  Annual Date / Birth control check : overdue-- to be done with next injection   • Last Depo date: 05/03/2023  • Side effects: no  • HCG: no  o if applicable: negative  • Given by: ev  • Site: left deltoid    o Calcium supplement daily teaching  o Condoms for 2 weeks following first injection dose.

## 2023-11-07 ENCOUNTER — ANNUAL EXAM (OUTPATIENT)
Dept: OBGYN CLINIC | Facility: CLINIC | Age: 33
End: 2023-11-07

## 2023-11-07 ENCOUNTER — PATIENT OUTREACH (OUTPATIENT)
Dept: OBGYN CLINIC | Facility: CLINIC | Age: 33
End: 2023-11-07

## 2023-11-07 VITALS
HEIGHT: 63 IN | DIASTOLIC BLOOD PRESSURE: 66 MMHG | SYSTOLIC BLOOD PRESSURE: 115 MMHG | RESPIRATION RATE: 18 BRPM | WEIGHT: 176 LBS | BODY MASS INDEX: 31.18 KG/M2 | HEART RATE: 100 BPM

## 2023-11-07 DIAGNOSIS — B37.31 VAGINAL YEAST INFECTION: Primary | ICD-10-CM

## 2023-11-07 DIAGNOSIS — R10.2 PELVIC PAIN: ICD-10-CM

## 2023-11-07 DIAGNOSIS — Z78.9 LANGUAGE BARRIER: ICD-10-CM

## 2023-11-07 DIAGNOSIS — R39.9 UTI SYMPTOMS: ICD-10-CM

## 2023-11-07 LAB
BV WHIFF TEST VAG QL: ABNORMAL
CLUE CELLS SPEC QL WET PREP: ABNORMAL
SL AMB  POCT GLUCOSE, UA: NORMAL
SL AMB LEUKOCYTE ESTERASE,UA: NORMAL
SL AMB POCT BILIRUBIN,UA: NORMAL
SL AMB POCT BLOOD,UA: NORMAL
SL AMB POCT CLARITY,UA: CLEAR
SL AMB POCT COLOR,UA: YELLOW
SL AMB POCT KETONES,UA: NORMAL
SL AMB POCT NITRITE,UA: NORMAL
SL AMB POCT PH,UA: 5
SL AMB POCT SPECIFIC GRAVITY,UA: 1.02
SL AMB POCT URINE PROTEIN: NORMAL
SL AMB POCT UROBILINOGEN: 0.2
SL AMB POCT WET MOUNT: ABNORMAL
T VAGINALIS VAG QL WET PREP: ABNORMAL
YEAST VAG QL WET PREP: ABNORMAL

## 2023-11-07 PROCEDURE — 87591 N.GONORRHOEAE DNA AMP PROB: CPT | Performed by: NURSE PRACTITIONER

## 2023-11-07 PROCEDURE — 81002 URINALYSIS NONAUTO W/O SCOPE: CPT | Performed by: NURSE PRACTITIONER

## 2023-11-07 PROCEDURE — 87491 CHLMYD TRACH DNA AMP PROBE: CPT | Performed by: NURSE PRACTITIONER

## 2023-11-07 PROCEDURE — 96372 THER/PROPH/DIAG INJ SC/IM: CPT | Performed by: NURSE PRACTITIONER

## 2023-11-07 PROCEDURE — 87210 SMEAR WET MOUNT SALINE/INK: CPT | Performed by: NURSE PRACTITIONER

## 2023-11-07 PROCEDURE — 99213 OFFICE O/P EST LOW 20 MIN: CPT | Performed by: NURSE PRACTITIONER

## 2023-11-07 PROCEDURE — 87086 URINE CULTURE/COLONY COUNT: CPT | Performed by: NURSE PRACTITIONER

## 2023-11-07 RX ORDER — NITROFURANTOIN 25; 75 MG/1; MG/1
100 CAPSULE ORAL 2 TIMES DAILY
Qty: 10 CAPSULE | Refills: 0 | Status: SHIPPED | OUTPATIENT
Start: 2023-11-07 | End: 2023-11-12

## 2023-11-07 RX ORDER — FLUCONAZOLE 150 MG/1
150 TABLET ORAL ONCE
Qty: 1 TABLET | Refills: 0 | Status: SHIPPED | OUTPATIENT
Start: 2023-11-07 | End: 2023-11-07

## 2023-11-07 RX ADMIN — MEDROXYPROGESTERONE ACETATE 150 MG: 150 INJECTION, SUSPENSION INTRAMUSCULAR at 11:44

## 2023-11-07 NOTE — PROGRESS NOTES
Depo-Provera     [x]   Patient provided box yes   0 Refills remain  Refills submitted no  Last  Annual Date / Birth control check : 11/7/2023    Last Depo date: 08/15/2023  Side effects: no  HCG: no    Given by: Serina Andrea MA    Site: right deltoid     Calcium supplement daily teaching  Condoms for 2 weeks following first injection dose.

## 2023-11-07 NOTE — PROGRESS NOTES
JENNIFER CM met with pt and Provider to assist with interpretation during pre sumanth visit. Pt was examined. Will  2 medication. Will take fluconazole one time for fungal infection and antibiotic twice a day for 5 days for UTI. Pt verbalized understanding. Pt to return in 2 weeks for pap and discussed long term contraception.

## 2023-11-07 NOTE — PROGRESS NOTES
ASSESSMENT & PLAN: Geoff Edward is a 34 y.o. [id number] with {NORMAL/ABNORMAL MUBA:66864} gynecologic exam.    1.  Routine well woman exam done today  2. Pap and HPV:  The patient's last pap and hpv was ***. It was {NORMAL/ABNORMAL ONLY:}. Pap and cotesting {DONE/NOT DONE:6842908536::"was not"} done today. Current ASCCP Guidelines reviewed. ***  3.  The following were reviewed in today's visit: {Gyn counselin}. 4. Gardisil vaccine in women up to age 39 discussed and information was provided. Depression Screening Follow-up Plan: Patient's depression screening was positive with a PHQ-2 score of . Their PHQ-9 score was . {Depression screen follow-up plan:1367288055}      BMI Counseling: There is no height or weight on file to calculate BMI. The BMI {VB BMI Counselin}    CC:  Annual Gynecologic Examination    HPI: Geoff Edward is a 34 y.o. [id number] who presents for annual gynecologic examination. She has the following concerns:  ***    Health Maintenance:    She wears her seatbelt routinely. She {DOES/DOES STK:53804} perform {Desc; regular/irre} monthly self breast exams. She feels safe at home. Past Medical History:   Diagnosis Date    Abnormal glucose affecting pregnancy 2022    Allergic     GBS bacteriuria 2022    Treat in labor    Varicella        Past Surgical History:   Procedure Laterality Date    NO PAST SURGERIES      TONSILLECTOMY         Past OB/Gyn History:  OB History          2    Para   2    Term   2            AB        Living   2         SAB        IAB        Ectopic        Multiple   0    Live Births   2               Pt {HAS/DOES NOT KGSS:63286} menstrual issues. ***   History of sexually transmitted infection: {YES/NO:92531}. History of abnormal pap smears: {YES/NO:85045} ***. Patient {IS/ IS NOT:67438} currently sexually active. {Sexual social history md:11786}.   The current method of family planning is {contraception:418884}. Family History   Problem Relation Age of Onset    Diabetes Maternal Uncle        Social History:  Social History     Socioeconomic History    Marital status: /Civil Union     Spouse name: Not on file    Number of children: Not on file    Years of education: Not on file    Highest education level: Not on file   Occupational History    Not on file   Tobacco Use    Smoking status: Never    Smokeless tobacco: Never   Vaping Use    Vaping Use: Never used   Substance and Sexual Activity    Alcohol use: Never    Drug use: Never    Sexual activity: Yes     Partners: Male     Birth control/protection: None   Other Topics Concern    Not on file   Social History Narrative    Not on file     Social Determinants of Health     Financial Resource Strain: Low Risk  (7/28/2022)    Overall Financial Resource Strain (CARDIA)     Difficulty of Paying Living Expenses: Not hard at all   Food Insecurity: No Food Insecurity (7/28/2022)    Hunger Vital Sign     Worried About Running Out of Food in the Last Year: Never true     [address] in the Last Year: Never true   Transportation Needs: No Transportation Needs (7/28/2022)    PRAPARE - Transportation     Lack of Transportation (Medical): No     Lack of Transportation (Non-Medical): No   Physical Activity: Insufficiently Active (1/18/2022)    Exercise Vital Sign     Days of Exercise per Week: 3 days     Minutes of Exercise per Session: 30 min   Stress: No Stress Concern Present (1/11/2022)    [facility]     Feeling of Stress : Only a little   Social Connections:  Moderately Integrated (1/18/2022)    Social Connection and Isolation Panel [NHANES]     Frequency of Communication with Friends and Family: Once a week     Frequency of Social Gatherings with Friends and Family: Twice a week     Attends [employer] Services: More than 4 times per year     Active Member of [employer] or Organizations: No     Attends Club or Organization Meetings: More than 4 times per year     Marital Status: Never    Intimate Partner Violence: Not At Risk (1/11/2022)    Humiliation, Afraid, Rape, and Kick questionnaire     Fear of Current or Ex-Partner: No     Emotionally Abused: No     Physically Abused: No     Sexually Abused: No   Housing Stability: Low Risk  (7/28/2022)    Housing Stability Vital Sign     Unable to Pay for Housing in the Last Year: No     Number of [address] in the Last Year: 1     Unstable Housing in the Last Year: No     Presently lives with ***. Patient is {Desc; marital status:62}. Patient is currently {[facility] Employment:70333} ***    No Known Allergies      Current Outpatient Medications:     acetaminophen (TYLENOL) 325 mg tablet, Take 2 tablets (650 mg total) by mouth every 4 (four) hours as needed for mild pain (Patient not taking: No sig reported), Disp: , Rfl: 0    clotrimazole-betamethasone (LOTRISONE) 1-0.05 % cream, Apply topically 2 (two) times a day for 14 days Use sparingly, Disp: 30 g, Rfl: 0    docusate sodium (COLACE) 100 mg capsule, Take 1 capsule (100 mg total) by mouth 2 (two) times a day (Patient not taking: No sig reported), Disp: 60 capsule, Rfl: 6    fluconazole (DIFLUCAN) 150 mg tablet, Take 150 mg by mouth once, Disp: , Rfl:     fluticasone (FLONASE) 50 mcg/act nasal spray, Week 1  administer 2 sprays in each nostril per day. Week 2 administer  1 spray in each nostril daily. (Patient taking differently: Week 1  administer 2 sprays in each nostril per day.  Week 2 administer  1 spray in each nostril daily.), Disp: 16 g, Rfl: 3    ibuprofen (MOTRIN) 600 mg tablet, Take 1 tablet (600 mg total) by mouth every 6 (six) hours as needed for moderate pain (Patient not taking: No sig reported), Disp: 30 tablet, Rfl: 0    medroxyPROGESTERone (DEPO-PROVERA) 150 mg/mL injection, Inject 1 mL (150 mg total) into a muscle every 3 (three) months, Disp: 1 mL, Rfl: 1 Prenatal Vit-Fe Fumarate-FA (PRENATAL PO), Take by mouth, Disp: , Rfl:     Current Facility-Administered Medications:     medroxyPROGESTERone (DEPO-PROVERA) IM injection 150 mg, 150 mg, Intramuscular, Q3 Months, Eugene Singleton MD, 150 mg at 08/15/23 1114      Review of Systems  Constitutional :no fever, feels well, no tiredness, no recent weight gain or loss  ENT: no ear ache, no loss of hearing, no nosebleeds or nasal discharge, no sore throat or hoarseness. Cardiovascular: no complaints of slow or fast heart beat, no chest pain, no palpitations, no leg claudication or lower extremity edema. Respiratory: no complaints of shortness of shortness of breath, no MOSHER  Breasts:no complaints of breast pain, breast lump, or nipple discharge  Gastrointestinal: no complaints of abdominal pain, constipation, nausea, vomiting, or diarrhea or bloody stools  Genitourinary : no complaints of dysuria, incontinence, pelvic pain, no dysmenorrhea, vaginal discharge or abnormal vaginal bleeding and as noted in HPI. Musculoskeletal: no complaints of arthralgia, no myalgia, no joint swelling or stiffness, no limb pain or swelling. Integumentary: no complaints of skin rash or lesion, itching or dry skin  Neurological: no complaints of headache, no confusion, no numbness or tingling, no dizziness or fainting    Objective      There were no vitals taken for this visit.   General:   appears stated age, cooperative, alert normal mood and affect   Neck: normal, supple,trachea midline, no masses   Heart: regular rate and rhythm, S1, S2 normal, no murmur, click, rub or gallop   Lungs: clear to auscultation bilaterally   Breasts: {EXAM; BREAST:82131}   Abdomen: soft, non-tender, without masses or organomegaly   Vulva: {EXAM; GYN JTDET:34845}   Vagina: {exam; vagina:33358}   Urethra: {Urethra:20261}   Cervix: {PE Cervix:18476::"Normal, no discharge."}   Uterus: {exam; uterus:11915}   Adnexa: {exam; adnexa:96555}   Lymphatic palpation of lymph nodes in neck, axilla, groin and/or other locations: no lymphadenopathy or masses noted   Skin normal skin turgor and no rashes.    Psychiatric orientation to person, place, and time: normal. mood and affect: normal

## 2023-11-07 NOTE — PROGRESS NOTES
Assessment/Plan:    No problem-specific Assessment & Plan notes found for this encounter. Diagnoses and all orders for this visit:    Vaginal yeast infection  -     fluconazole (DIFLUCAN) 150 mg tablet; Take 1 tablet (150 mg total) by mouth once for 1 dose  -     POCT wet mount    UTI symptoms  -     POCT urine dip  -     nitrofurantoin (MACROBID) 100 mg capsule; Take 1 capsule (100 mg total) by mouth 2 (two) times a day for 5 days  -     Chlamydia/GC amplified DNA by PCR  -     Urine culture will call results to patient    Language barrier  -     Ambulatory referral to social work care management program; Future    Pelvic pain  -     POCT urine dip  -     Chlamydia/GC amplified DNA by PCR  -     Urine culture      Received Depo Provera today,   Annual in 2 wks-may want IUD instead discuss at her next appointment. Subjective:      Patient ID: Mickie Sanders is a 34 y.o. female. HPI 33 yo here with UTI symptoms. [id number]  used. She is scheduled for her annual today but will need to reschedule,  pt's child crying so interpretation also done by Martha Machado. Pt reports pain with urination, burning and itching, no dsch. She also reports voiding in small amounts feels like she is not emptying her bladder. Lmp 10/27/23. She is on depo for contraception. She has had bleeding for the last 1 month.  consents to testing for chlamydia and gonorrhea today. Urine Dip today positive for small leukocytes, trace protein and small blood. The following portions of the patient's history were reviewed and updated as appropriate: allergies, current medications, past family history, past medical history, past social history, past surgical history, and problem list.    Review of Systems   Constitutional:  Negative for chills and fever. Respiratory: Negative. Cardiovascular: Negative. Genitourinary:  Negative for vaginal discharge.         Burning with urination  Voiding in small amounts Objective:      /66 (BP Location: Left arm, Patient Position: Sitting, Cuff Size: Adult)   Pulse 100   Resp 18   Ht 5' 3" (1.6 m)   Wt 79.8 kg (176 lb)   LMP  (LMP Unknown)   BMI 31.18 kg/m²          Physical Exam  Constitutional:       Appearance: Normal appearance. Cardiovascular:      Rate and Rhythm: Normal rate. Pulmonary:      Effort: Pulmonary effort is normal.   Abdominal:      Palpations: Abdomen is soft. Tenderness: There is no abdominal tenderness.            External genitalia-no lesions or erythema  Vagina- thick  white discharge  Cervix-negative CMT no lesions  Uterus-anteverted, nontender  Adnexa-no masses nontender    Wet mount KOH-numerous yeast hyphae

## 2023-11-08 LAB
BACTERIA UR CULT: NORMAL
C TRACH DNA SPEC QL NAA+PROBE: NEGATIVE
N GONORRHOEA DNA SPEC QL NAA+PROBE: NEGATIVE

## 2023-11-09 ENCOUNTER — TELEPHONE (OUTPATIENT)
Dept: OBGYN CLINIC | Facility: CLINIC | Age: 33
End: 2023-11-09

## 2023-11-28 ENCOUNTER — ANNUAL EXAM (OUTPATIENT)
Dept: OBGYN CLINIC | Facility: CLINIC | Age: 33
End: 2023-11-28

## 2023-11-28 VITALS
HEIGHT: 63 IN | BODY MASS INDEX: 31.89 KG/M2 | SYSTOLIC BLOOD PRESSURE: 118 MMHG | DIASTOLIC BLOOD PRESSURE: 65 MMHG | HEART RATE: 96 BPM | WEIGHT: 180 LBS | RESPIRATION RATE: 16 BRPM

## 2023-11-28 DIAGNOSIS — Z30.42 ENCOUNTER FOR SURVEILLANCE OF INJECTABLE CONTRACEPTIVE: ICD-10-CM

## 2023-11-28 DIAGNOSIS — Z01.419 ENCOUNTER FOR GYNECOLOGICAL EXAMINATION WITHOUT ABNORMAL FINDING: Primary | ICD-10-CM

## 2023-11-28 DIAGNOSIS — Z78.9 LANGUAGE BARRIER: ICD-10-CM

## 2023-11-28 PROCEDURE — 87591 N.GONORRHOEAE DNA AMP PROB: CPT | Performed by: NURSE PRACTITIONER

## 2023-11-28 PROCEDURE — 87491 CHLMYD TRACH DNA AMP PROBE: CPT | Performed by: NURSE PRACTITIONER

## 2023-11-28 RX ORDER — MEDROXYPROGESTERONE ACETATE 150 MG/ML
150 INJECTION, SUSPENSION INTRAMUSCULAR
Qty: 1 ML | Refills: 3 | Status: SHIPPED | OUTPATIENT
Start: 2023-11-28

## 2023-11-30 ENCOUNTER — TELEPHONE (OUTPATIENT)
Dept: OBGYN CLINIC | Facility: CLINIC | Age: 33
End: 2023-11-30

## 2023-11-30 LAB
C TRACH DNA SPEC QL NAA+PROBE: NEGATIVE
N GONORRHOEA DNA SPEC QL NAA+PROBE: NEGATIVE

## 2023-11-30 NOTE — TELEPHONE ENCOUNTER
ID [id number]    Attempted to call, left a message referring patient to my chart to review recent test results. Office number provided in case of any questions or concerns.

## 2023-11-30 NOTE — TELEPHONE ENCOUNTER
----- Message from Julia Gilbert, [address] [place] [address] sent at 11/30/2023  3:15 PM EST -----  Please inform pt that her culture for chlamydia and gonorrhea were negative. Thank You!

## 2024-01-02 ENCOUNTER — OFFICE VISIT (OUTPATIENT)
Dept: OBGYN CLINIC | Facility: CLINIC | Age: 34
End: 2024-01-02

## 2024-01-02 VITALS
SYSTOLIC BLOOD PRESSURE: 105 MMHG | BODY MASS INDEX: 31.89 KG/M2 | RESPIRATION RATE: 18 BRPM | WEIGHT: 180 LBS | HEART RATE: 111 BPM | HEIGHT: 63 IN | DIASTOLIC BLOOD PRESSURE: 68 MMHG

## 2024-01-02 DIAGNOSIS — R39.9 UTI SYMPTOMS: Primary | ICD-10-CM

## 2024-01-02 LAB
SL AMB  POCT GLUCOSE, UA: NORMAL
SL AMB LEUKOCYTE ESTERASE,UA: NORMAL
SL AMB POCT BILIRUBIN,UA: NORMAL
SL AMB POCT BLOOD,UA: NORMAL
SL AMB POCT CLARITY,UA: CLEAR
SL AMB POCT COLOR,UA: YELLOW
SL AMB POCT KETONES,UA: NORMAL
SL AMB POCT NITRITE,UA: NORMAL
SL AMB POCT PH,UA: 5
SL AMB POCT SPECIFIC GRAVITY,UA: 1.01
SL AMB POCT URINE PROTEIN: NORMAL
SL AMB POCT UROBILINOGEN: 0.2

## 2024-01-02 PROCEDURE — 87086 URINE CULTURE/COLONY COUNT: CPT | Performed by: OBSTETRICS & GYNECOLOGY

## 2024-01-02 PROCEDURE — 81002 URINALYSIS NONAUTO W/O SCOPE: CPT | Performed by: OBSTETRICS & GYNECOLOGY

## 2024-01-02 PROCEDURE — 99213 OFFICE O/P EST LOW 20 MIN: CPT | Performed by: OBSTETRICS & GYNECOLOGY

## 2024-01-02 RX ORDER — NITROFURANTOIN 25; 75 MG/1; MG/1
100 CAPSULE ORAL 2 TIMES DAILY
Qty: 10 CAPSULE | Refills: 0 | Status: SHIPPED | OUTPATIENT
Start: 2024-01-02 | End: 2024-01-07

## 2024-01-02 NOTE — ASSESSMENT & PLAN NOTE
Patient reported symptoms of UTI  Previous UTI treated with Macrobid but was also associated with white thick discharge.     In-office UA negative.    Patient with UTI symptoms that are persisting despite negative UA for UTI.   Start Macrobid 100 mg PO BID for 5-days  Reassess at next office visit/as-needed.

## 2024-01-02 NOTE — PROGRESS NOTES
[facility] OB/GYN Office Visit  Stacy Esparza 33 y.o. female   MRN: [id number] : 1990  ENCOUNTER: 2024 3:27 PM    Assessment and Plan   UTI symptoms  Patient reported symptoms of UTI  Previous UTI treated with Macrobid but was also associated with white thick discharge.     In-office UA negative.    Patient with UTI symptoms that are persisting despite negative UA for UTI.   Start Macrobid 100 mg PO BID for 5-days  Reassess at next office visit/as-needed.       Chief Complaint     Chief Complaint   Patient presents with    Urinary Tract Infection       History of Present Illness   Stacy Esparza is a 33 y.o.-year-old female who presents today for possible UTI.  Says she started exhibiting urinary symptoms 2-days ago with urgency and frequency.  Says the last time she had UTI was 1.5 months  ago (2023), where she was prescribed antibiotic with full resolution of her symptoms.  She also reports associated bilateral lower back pain.  Says she is urinating minimally but constantly has the urge to use the restroom  Denies any fevers, chills, SOB, chest pain, palpitations, abdominal pain, pelvic pain, vaginal bleeding, vaginal discharge, foul odor. Denies any other symptoms /associated symptoms today.     ( [id number])    Review of Systems   Review of Systems   Constitutional:  Negative for activity change, appetite change, chills, fatigue and fever.   Respiratory:  Negative for shortness of breath.    Cardiovascular:  Negative for chest pain.   Genitourinary:  Positive for decreased urine volume, dysuria, flank pain, frequency and urgency. Negative for difficulty urinating, dyspareunia, enuresis, genital sores, hematuria, menstrual problem, pelvic pain, vaginal bleeding, vaginal discharge and vaginal pain.   Neurological:  Negative for headaches.       Active Problem List     Patient Active Problem List   Diagnosis    Rhinitis medicamentosa    Chronic rhinitis     "Psoriasis    Headache    Other abnormal glucose    Language barrier     (spontaneous vaginal delivery)    Yeast infection of the skin    Symptoms in breast    Vaginal yeast infection    UTI symptoms    Encounter for gynecological examination without abnormal finding    Encounter for surveillance of injectable contraceptive       Past Medical History, Past Surgical History, Family History, and Social History were reviewed and updated today as appropriate.    Objective   /68 (BP Location: Right arm, Patient Position: Sitting, Cuff Size: Adult)   Pulse (!) 111   Resp 18   Ht 5' 3\" (1.6 m)   Wt 81.6 kg (180 lb)   LMP  (LMP Unknown)   BMI 31.89 kg/m²     Physical Exam  Vitals and nursing note reviewed.   Constitutional:       General: She is not in acute distress.     Appearance: Normal appearance. She is not ill-appearing, toxic-appearing or diaphoretic.   HENT:      Head: Normocephalic and atraumatic.   Cardiovascular:      Rate and Rhythm: Normal rate and regular rhythm.      Pulses: Normal pulses.      Heart sounds: Normal heart sounds. No murmur heard.  Pulmonary:      Effort: Pulmonary effort is normal.      Breath sounds: Normal breath sounds. No wheezing.   Abdominal:      General: Abdomen is flat. Bowel sounds are normal. There is no distension.      Palpations: Abdomen is soft. There is no mass.      Tenderness: There is no abdominal tenderness. There is no right CVA tenderness, left CVA tenderness, guarding or rebound.   Musculoskeletal:         General: Normal range of motion.      Cervical back: Normal range of motion.      Right lower leg: No edema.      Left lower leg: No edema.   Skin:     General: Skin is warm.      Capillary Refill: Capillary refill takes less than 2 seconds.      Findings: No rash.   Neurological:      General: No focal deficit present.      Mental Status: She is alert.   Psychiatric:         Mood and Affect: Mood normal.         Behavior: Behavior normal. " "        Pertinent Laboratory/Diagnostic Studies:  Lab Results   Component Value Date    BUN 11 10/26/2021    CREATININE 0.67 10/26/2021    CALCIUM 9.5 10/26/2021    K 3.7 10/26/2021    CO2 24 10/26/2021     10/26/2021     Lab Results   Component Value Date    ALT 55 01/11/2022    AST 23 10/26/2021    ALKPHOS 81 10/26/2021       Lab Results   Component Value Date    WBC 9.55 06/03/2022    HGB 12.8 06/03/2022    HCT 40.2 06/03/2022    MCV 87 06/03/2022     06/03/2022       No results found for: \"TSH\"    No results found for: \"CHOL\"  Lab Results   Component Value Date    TRIG 219 (H) 01/11/2022     Lab Results   Component Value Date    HDL 46 (L) 01/11/2022     Lab Results   Component Value Date    LDLCALC 157 (H) 01/11/2022     No results found for: \"HGBA1C\"    Results for orders placed or performed in visit on 01/02/24   POCT urine dip   Result Value Ref Range    LEUKOCYTE ESTERASE,UA neg     NITRITE,UA neg     SL AMB POCT UROBILINOGEN 0.2     POCT URINE PROTEIN neg      PH,UA 5.0     BLOOD,UA small     SPECIFIC GRAVITY,UA 1.015     KETONES,UA neg     BILIRUBIN,UA neg     GLUCOSE, UA neg      COLOR,UA yellow     CLARITY,UA clear        Orders Placed This Encounter   Procedures    Urine culture    POCT urine dip         Current Medications     Current Outpatient Medications   Medication Sig Dispense Refill    medroxyPROGESTERone (DEPO-PROVERA) 150 mg/mL injection Inject 1 mL (150 mg total) into a muscle every 3 (three) months 1 mL 3    nitrofurantoin (MACROBID) 100 mg capsule Take 1 capsule (100 mg total) by mouth 2 (two) times a day for 5 days 10 capsule 0    acetaminophen (TYLENOL) 325 mg tablet Take 2 tablets (650 mg total) by mouth every 4 (four) hours as needed for mild pain (Patient not taking: Reported on 7/14/2022)  0    clotrimazole-betamethasone (LOTRISONE) 1-0.05 % cream Apply topically 2 (two) times a day for 14 days Use sparingly 30 g 0    docusate sodium (COLACE) 100 mg capsule Take 1 " capsule (100 mg total) by mouth 2 (two) times a day (Patient not taking: Reported on 4/15/2022) 60 capsule 6    fluticasone (FLONASE) 50 mcg/act nasal spray Week 1  administer 2 sprays in each nostril per day. Week 2 administer  1 spray in each nostril daily. (Patient taking differently: Week 1  administer 2 sprays in each nostril per day. Week 2 administer  1 spray in each nostril daily.) 16 g 3    ibuprofen (MOTRIN) 600 mg tablet Take 1 tablet (600 mg total) by mouth every 6 (six) hours as needed for moderate pain (Patient not taking: Reported on 7/14/2022) 30 tablet 0    Prenatal Vit-Fe Fumarate-FA (PRENATAL PO) Take by mouth (Patient not taking: Reported on 11/7/2023)       Current Facility-Administered Medications   Medication Dose Route Frequency Provider Last Rate Last Admin    medroxyPROGESTERone (DEPO-PROVERA) IM injection 150 mg  150 mg Intramuscular Q3 Months Barbie Quintero MD   150 mg at 11/07/23 1144       ALLERGIES:  No Known Allergies    Health Maintenance     Health Maintenance   Topic Date Due    Influenza Vaccine (1) 09/01/2023    COVID-19 Vaccine (3 - 2023-24 season) 09/01/2023    BMI: Followup Plan  11/28/2024    BMI: Adult  11/28/2024    Annual Physical  11/28/2024    Depression Screening  01/02/2025    Cervical Cancer Screening  01/27/2027    DTaP,Tdap,and Td Vaccines (2 - Td or Tdap) 03/31/2032    HIV Screening  Completed    Hepatitis C Screening  Completed    Pneumococcal Vaccine: Pediatrics (0 to 5 Years) and At-Risk Patients (6 to 64 Years)  Aged Out    HIB Vaccine  Aged Out    IPV Vaccine  Aged Out    Hepatitis A Vaccine  Aged Out    Meningococcal ACWY Vaccine  Aged Out    HPV Vaccine  Aged Out     Immunization History   Administered Date(s) Administered    COVID-19 PFIZER VACCINE 0.3 ML IM 07/19/2021, 08/22/2021    Influenza, injectable, quadrivalent, preservative free 0.5 mL 10/25/2021    Tdap 03/31/2022         DO Matheus Acevedo [facility]  1/2/2024  3:27 PM    Parts  of this note were dictated using M*Modal dictation software and may have sounds-like errors due to variation in pronunciation.

## 2024-01-04 LAB — BACTERIA UR CULT: NORMAL

## 2024-01-11 ENCOUNTER — OFFICE VISIT (OUTPATIENT)
Dept: OBGYN CLINIC | Facility: CLINIC | Age: 34
End: 2024-01-11

## 2024-01-11 VITALS
SYSTOLIC BLOOD PRESSURE: 130 MMHG | DIASTOLIC BLOOD PRESSURE: 66 MMHG | HEIGHT: 63 IN | BODY MASS INDEX: 31.54 KG/M2 | WEIGHT: 178 LBS | HEART RATE: 100 BPM | RESPIRATION RATE: 18 BRPM

## 2024-01-11 DIAGNOSIS — B37.31 VAGINAL YEAST INFECTION: Primary | ICD-10-CM

## 2024-01-11 DIAGNOSIS — R39.9 UTI SYMPTOMS: ICD-10-CM

## 2024-01-11 DIAGNOSIS — Z78.9 LANGUAGE BARRIER: ICD-10-CM

## 2024-01-11 DIAGNOSIS — Z20.2 POSSIBLE EXPOSURE TO STD: ICD-10-CM

## 2024-01-11 LAB
BV WHIFF TEST VAG QL: ABNORMAL
CLUE CELLS SPEC QL WET PREP: ABNORMAL
PH SMN: 4.5 [PH]
SL AMB  POCT GLUCOSE, UA: NORMAL
SL AMB LEUKOCYTE ESTERASE,UA: NORMAL
SL AMB POCT BILIRUBIN,UA: NORMAL
SL AMB POCT BLOOD,UA: NORMAL
SL AMB POCT CLARITY,UA: CLEAR
SL AMB POCT COLOR,UA: YELLOW
SL AMB POCT KETONES,UA: NORMAL
SL AMB POCT NITRITE,UA: NORMAL
SL AMB POCT PH,UA: 5
SL AMB POCT SPECIFIC GRAVITY,UA: 1.02
SL AMB POCT URINE PROTEIN: NORMAL
SL AMB POCT UROBILINOGEN: 0.2
SL AMB POCT WET MOUNT: ABNORMAL
T VAGINALIS VAG QL WET PREP: ABNORMAL
YEAST VAG QL WET PREP: ABNORMAL

## 2024-01-11 PROCEDURE — 87210 SMEAR WET MOUNT SALINE/INK: CPT | Performed by: NURSE PRACTITIONER

## 2024-01-11 PROCEDURE — 87086 URINE CULTURE/COLONY COUNT: CPT | Performed by: NURSE PRACTITIONER

## 2024-01-11 PROCEDURE — 99213 OFFICE O/P EST LOW 20 MIN: CPT | Performed by: NURSE PRACTITIONER

## 2024-01-11 PROCEDURE — 87591 N.GONORRHOEAE DNA AMP PROB: CPT | Performed by: NURSE PRACTITIONER

## 2024-01-11 PROCEDURE — 87491 CHLMYD TRACH DNA AMP PROBE: CPT | Performed by: NURSE PRACTITIONER

## 2024-01-11 PROCEDURE — 81002 URINALYSIS NONAUTO W/O SCOPE: CPT | Performed by: NURSE PRACTITIONER

## 2024-01-11 RX ORDER — FLUCONAZOLE 150 MG/1
150 TABLET ORAL ONCE
COMMUNITY
Start: 2023-11-07

## 2024-01-11 NOTE — PROGRESS NOTES
Assessment/Plan:    No problem-specific Assessment & Plan notes found for this encounter.       Diagnoses and all orders for this visit:    Vaginal yeast infection  -     miconazole (MONISTAT-7) 2 % vaginal cream; Insert 1 applicator into the vagina daily at bedtime for 7 days  -     POCT wet mount     Discussed with patient yeast infection and treatment.  Desires to use miconazole 1 applicator per vagina x7 nights.   Use medication until completed. Reviewed prevention,    recommend to  bathe with mild soap such as Dove unscented,   avoid scented products, change clothing when moist,  avoid tight restrictive clothing.      UTI symptoms  -     Urine culture- will call results, if negative, reviewed referral to urology with pt.   -     POCT urine dip- trace blood and leuks, protein    Possible exposure to STD  -     Chlamydia/GC amplified DNA by PCR    Language barrier    Other orders  -     fluconazole (DIFLUCAN) 150 mg tablet; Take 150 mg by mouth once (Patient not taking: Reported on 2024)          Subjective:      Patient ID: Stacy Esparza is a 33 y.o. female.    HPI 33-year-old -0-0-2 here with persistent UTI symptoms.  [place]   [id number] used clean-catch urine done 2024 that showed no growth.  She  had a negative chlamydia and gonorrhea 2023.  Pt desires retest. Treated for yeast 2023 that resolved.  Was seen in the office 2024 and treated for UTI symptoms with Macrobid 100 mg twice daily x 5 days    Today patient reports vaginal itching, has a white discharge.  Reports pain  with sex.   She reports pain with urination, she reports urgency but urinates only a few drops initially,  reports frequency.  Reports itching and burning  for the past 1.5 wks.   She reports drinks 2 cans of coke daily but recdently stopped use.     On Depo for contraception, she is amenorrheic with use.   Urine dip trace blood and leuks, negative glucose.     The following portions of  "the patient's history were reviewed and updated as appropriate: allergies, current medications, past family history, past medical history, past social history, past surgical history, and problem list.    Review of Systems   Constitutional:  Negative for chills and fever.   Respiratory: Negative.     Cardiovascular: Negative.    Genitourinary:  Positive for dyspareunia, dysuria, frequency, urgency and vaginal discharge. Negative for flank pain and menstrual problem.        Itching and burning    Musculoskeletal:  Positive for back pain.         Objective:      /66 (BP Location: Right arm, Patient Position: Sitting, Cuff Size: Adult)   Pulse 100   Resp 18   Ht 5' 3\" (1.6 m)   Wt 80.7 kg (178 lb)   LMP  (LMP Unknown)   BMI 31.53 kg/m²          Physical Exam  Constitutional:       Appearance: Normal appearance.   Cardiovascular:      Rate and Rhythm: Normal rate.   Pulmonary:      Effort: Pulmonary effort is normal.   Abdominal:      Palpations: Abdomen is soft.      Tenderness: There is no right CVA tenderness or left CVA tenderness.      Comments: Discomfort suprapubically           External genitalia-no lesions or erythema  Vagina- small amount white discharge  Cervix-negative CMT no lesions  Uterus-anteverted, nontender, some discomfort suprapubically  Adnexa-no masses nontender    Wet mount KOH-yeast hyphae    "

## 2024-01-18 DIAGNOSIS — R39.9 UTI SYMPTOMS: Primary | ICD-10-CM

## 2024-01-27 PROBLEM — Z01.419 ENCOUNTER FOR GYNECOLOGICAL EXAMINATION WITHOUT ABNORMAL FINDING: Status: RESOLVED | Noted: 2023-11-28 | Resolved: 2024-01-27

## 2024-02-06 ENCOUNTER — CLINICAL SUPPORT (OUTPATIENT)
Dept: OBGYN CLINIC | Facility: CLINIC | Age: 34
End: 2024-02-06

## 2024-02-06 DIAGNOSIS — Z30.42 ENCOUNTER FOR MANAGEMENT AND INJECTION OF DEPO-PROVERA: Primary | ICD-10-CM

## 2024-02-06 PROCEDURE — 96372 THER/PROPH/DIAG INJ SC/IM: CPT

## 2024-02-06 RX ADMIN — MEDROXYPROGESTERONE ACETATE 150 MG: 150 INJECTION, SUSPENSION INTRAMUSCULAR at 15:18

## 2024-02-06 NOTE — PROGRESS NOTES
Depo-Provera     [x]   Patient provided box yes   3 Refills remain  Refills submitted no  Last  Annual Date / Birth control check : 11/28/2023  Last Depo date: 11/28/2023  Side effects: no  HCG: no  Given by: Teddy Michael MA   Site: right deltoid     Calcium supplement daily teaching  Condoms for 2 weeks following first injection dose.

## 2024-04-30 ENCOUNTER — CLINICAL SUPPORT (OUTPATIENT)
Dept: OBGYN CLINIC | Facility: CLINIC | Age: 34
End: 2024-04-30

## 2024-04-30 DIAGNOSIS — Z30.42 ENCOUNTER FOR MANAGEMENT AND INJECTION OF DEPO-PROVERA: Primary | ICD-10-CM

## 2024-04-30 PROCEDURE — 96372 THER/PROPH/DIAG INJ SC/IM: CPT

## 2024-04-30 RX ADMIN — MEDROXYPROGESTERONE ACETATE 150 MG: 150 INJECTION, SUSPENSION INTRAMUSCULAR at 13:43

## 2024-04-30 NOTE — PROGRESS NOTES
Depo-Provera     [x]   Patient provided box YES   2 Refills remain  Refills submitted no  Last  Annual Date / Birth control check : 11/28/2023  Last Depo date: 02/06/2024  Side effects: no  HCG: no  if applicable:   Given by: Lauren Leija  Site: PENG    Calcium supplement daily teaching  Condoms for 2 weeks following first injection dose.

## 2024-05-30 ENCOUNTER — OFFICE VISIT (OUTPATIENT)
Dept: FAMILY MEDICINE CLINIC | Facility: CLINIC | Age: 34
End: 2024-05-30

## 2024-05-30 VITALS
WEIGHT: 182 LBS | BODY MASS INDEX: 32.25 KG/M2 | HEIGHT: 63 IN | DIASTOLIC BLOOD PRESSURE: 76 MMHG | SYSTOLIC BLOOD PRESSURE: 118 MMHG | OXYGEN SATURATION: 97 % | TEMPERATURE: 98.5 F | HEART RATE: 101 BPM

## 2024-05-30 DIAGNOSIS — J01.00 ACUTE NON-RECURRENT MAXILLARY SINUSITIS: Primary | ICD-10-CM

## 2024-05-30 DIAGNOSIS — J02.9 SORE THROAT: ICD-10-CM

## 2024-05-30 LAB — S PYO AG THROAT QL: NEGATIVE

## 2024-05-30 PROCEDURE — 87880 STREP A ASSAY W/OPTIC: CPT

## 2024-05-30 PROCEDURE — 99213 OFFICE O/P EST LOW 20 MIN: CPT

## 2024-05-30 RX ORDER — FLUTICASONE PROPIONATE 50 MCG
SPRAY, SUSPENSION (ML) NASAL
Qty: 16 G | Refills: 3 | Status: SHIPPED | OUTPATIENT
Start: 2024-05-30 | End: 2024-11-07

## 2024-05-30 RX ORDER — AMOXICILLIN 875 MG/1
875 TABLET, COATED ORAL 2 TIMES DAILY
Qty: 20 TABLET | Refills: 0 | Status: SHIPPED | OUTPATIENT
Start: 2024-05-30 | End: 2024-06-09

## 2024-05-30 RX ORDER — BENZONATATE 200 MG/1
200 CAPSULE ORAL 3 TIMES DAILY PRN
Qty: 20 CAPSULE | Refills: 0 | Status: SHIPPED | OUTPATIENT
Start: 2024-05-30

## 2024-05-30 NOTE — PROGRESS NOTES
Ambulatory Visit  Name: Stacy Esparza      : 1990      MRN: [id number]  Encounter Provider: KAREL Bills  Encounter Date: 2024   Encounter department: [facility] MEDICINE    Assessment & Plan   1. Acute non-recurrent maxillary sinusitis  Assessment & Plan:  Sxs greater than a week not resolving, had negative at home test 24, rapid strep in office negative. Start Amoxicillin 875 mg bid x 10 days, benzonatate capsules and nasal steroid spray, continue Claritin and tylenol prn.  Orders:  -     fluticasone (FLONASE) 50 mcg/act nasal spray; Week 1  administer 2 sprays in each nostril per day. Week 2 administer  1 spray in each nostril daily.  -     amoxicillin (AMOXIL) 875 mg tablet; Take 1 tablet (875 mg total) by mouth 2 (two) times a day for 10 days  -     benzonatate (TESSALON) 200 MG capsule; Take 1 capsule (200 mg total) by mouth 3 (three) times a day as needed for cough  2. Sore throat  -     POCT rapid ANTIGEN strepA       History of Present Illness     Pt presents with c/o nasal congestion with yellow nasal discharge, sore throat, non-productive cough for greater than a week not improving with claritin and afrin. Pt had negative at home COVID test yesterday 24 will r/o strep        Review of Systems   Constitutional:  Negative for chills, fatigue and fever.   HENT:  Positive for congestion, ear pain, postnasal drip, sinus pressure, sinus pain and sore throat. Negative for ear discharge.    Respiratory:  Positive for cough. Negative for chest tightness, shortness of breath and wheezing.    Cardiovascular:  Negative for chest pain.   Allergic/Immunologic: Positive for environmental allergies.   Neurological:  Positive for headaches.     Current Outpatient Medications on File Prior to Visit   Medication Sig Dispense Refill    acetaminophen (TYLENOL) 325 mg tablet Take 2 tablets (650 mg total) by mouth every 4 (four) hours as needed for  "mild pain  0    medroxyPROGESTERone (DEPO-PROVERA) 150 mg/mL injection Inject 1 mL (150 mg total) into a muscle every 3 (three) months 1 mL 3    [DISCONTINUED] fluticasone (FLONASE) 50 mcg/act nasal spray Week 1  administer 2 sprays in each nostril per day. Week 2 administer  1 spray in each nostril daily. (Patient taking differently: Week 1  administer 2 sprays in each nostril per day. Week 2 administer  1 spray in each nostril daily.) 16 g 3    clotrimazole-betamethasone (LOTRISONE) 1-0.05 % cream Apply topically 2 (two) times a day for 14 days Use sparingly (Patient not taking: Reported on 5/30/2024) 30 g 0    docusate sodium (COLACE) 100 mg capsule Take 1 capsule (100 mg total) by mouth 2 (two) times a day (Patient not taking: Reported on 4/15/2022) 60 capsule 6    fluconazole (DIFLUCAN) 150 mg tablet Take 150 mg by mouth once (Patient not taking: Reported on 1/11/2024)      ibuprofen (MOTRIN) 600 mg tablet Take 1 tablet (600 mg total) by mouth every 6 (six) hours as needed for moderate pain (Patient not taking: Reported on 7/14/2022) 30 tablet 0    Prenatal Vit-Fe Fumarate-FA (PRENATAL PO) Take by mouth (Patient not taking: Reported on 11/7/2023)       Current Facility-Administered Medications on File Prior to Visit   Medication Dose Route Frequency Provider Last Rate Last Admin    medroxyPROGESTERone (DEPO-PROVERA) IM injection 150 mg  150 mg Intramuscular Q3 Months Brabie Quintero MD   150 mg at 04/30/24 1343      Social History     Tobacco Use    Smoking status: Never    Smokeless tobacco: Never   Vaping Use    Vaping status: Never Used   Substance and Sexual Activity    Alcohol use: Never    Drug use: Never    Sexual activity: Yes     Partners: Male     Birth control/protection: None, Injection     Objective     /76 (BP Location: Left arm, Patient Position: Sitting, Cuff Size: Standard)   Pulse 101   Temp 98.5 °F (36.9 °C) (Tympanic)   Ht 5' 3\" (1.6 m)   Wt 82.6 kg (182 lb)   SpO2 97%   BMI " 32.24 kg/m²     Physical Exam  Vitals and nursing note reviewed.   Constitutional:       General: She is not in acute distress.     Appearance: Normal appearance. She is well-developed. She is obese. She is not ill-appearing, toxic-appearing or diaphoretic.   HENT:      Head: Normocephalic and atraumatic.      Right Ear: Tympanic membrane, ear canal and external ear normal. No drainage, swelling or tenderness. No middle ear effusion. There is no impacted cerumen. Tympanic membrane is not erythematous.      Left Ear: Tympanic membrane, ear canal and external ear normal. No drainage, swelling or tenderness.  No middle ear effusion. There is no impacted cerumen. Tympanic membrane is not erythematous.      Nose: Congestion present. No rhinorrhea.      Right Sinus: Maxillary sinus tenderness present.      Left Sinus: Maxillary sinus tenderness present.      Mouth/Throat:      Mouth: Mucous membranes are moist.      Pharynx: Oropharynx is clear. Posterior oropharyngeal erythema present. No oropharyngeal exudate.   Eyes:      General: No scleral icterus.        Right eye: No discharge.         Left eye: No discharge.      Extraocular Movements: Extraocular movements intact.      Conjunctiva/sclera: Conjunctivae normal.      Pupils: Pupils are equal, round, and reactive to light.   Neck:      Vascular: No carotid bruit.   Cardiovascular:      Rate and Rhythm: Normal rate and regular rhythm.      Pulses: Normal pulses.      Heart sounds: Normal heart sounds. No murmur heard.  Pulmonary:      Effort: Pulmonary effort is normal. No respiratory distress.      Breath sounds: Normal breath sounds. No stridor. No wheezing, rhonchi or rales.   Chest:      Chest wall: No tenderness.   Abdominal:      General: Bowel sounds are normal. There is no distension.      Palpations: Abdomen is soft. There is no mass.      Tenderness: There is no abdominal tenderness. There is no right CVA tenderness, left CVA tenderness, guarding or rebound.       Hernia: No hernia is present.   Musculoskeletal:         General: No swelling, tenderness, deformity or signs of injury. Normal range of motion.      Cervical back: Normal range of motion and neck supple. No rigidity or tenderness.      Right lower leg: No edema.      Left lower leg: No edema.   Lymphadenopathy:      Cervical: No cervical adenopathy.   Skin:     General: Skin is warm.      Capillary Refill: Capillary refill takes less than 2 seconds.      Findings: No erythema, lesion or rash.   Neurological:      General: No focal deficit present.      Mental Status: She is alert and oriented to person, place, and time.      Cranial Nerves: No cranial nerve deficit.      Sensory: No sensory deficit.      Motor: No weakness.      Coordination: Coordination normal.      Gait: Gait normal.      Deep Tendon Reflexes: Reflexes normal.   Psychiatric:         Mood and Affect: Mood normal.         Behavior: Behavior normal.         Thought Content: Thought content normal.         Judgment: Judgment normal.       Administrative Statements

## 2024-05-30 NOTE — ASSESSMENT & PLAN NOTE
Sxs greater than a week not resolving, had negative at home test 5/29/24, rapid strep in office negative. Start Amoxicillin 875 mg bid x 10 days, benzonatate capsules and nasal steroid spray, continue Claritin and tylenol prn.

## 2024-06-21 DIAGNOSIS — J01.00 ACUTE NON-RECURRENT MAXILLARY SINUSITIS: ICD-10-CM

## 2024-06-21 RX ORDER — FLUTICASONE PROPIONATE 50 MCG
SPRAY, SUSPENSION (ML) NASAL
Qty: 48 ML | Refills: 1 | Status: SHIPPED | OUTPATIENT
Start: 2024-06-21

## 2024-06-29 PROBLEM — J01.00 ACUTE NON-RECURRENT MAXILLARY SINUSITIS: Status: RESOLVED | Noted: 2024-05-30 | Resolved: 2024-06-29

## 2024-07-16 ENCOUNTER — CLINICAL SUPPORT (OUTPATIENT)
Dept: OBGYN CLINIC | Facility: CLINIC | Age: 34
End: 2024-07-16

## 2024-07-16 DIAGNOSIS — Z30.42 ENCOUNTER FOR MANAGEMENT AND INJECTION OF DEPO-PROVERA: Primary | ICD-10-CM

## 2024-07-16 PROCEDURE — 96372 THER/PROPH/DIAG INJ SC/IM: CPT

## 2024-07-16 RX ADMIN — MEDROXYPROGESTERONE ACETATE 150 MG: 150 INJECTION, SUSPENSION INTRAMUSCULAR at 13:13

## 2024-07-16 NOTE — PROGRESS NOTES
Depo-Provera     [x]   Patient provided box no    1 Refills remain  Refills submitted no  Last  Annual Date / Birth control check : 11/28/2023  Last Depo date: 04/30/24  Side effects: no  HCG: no  if applicable:   Given by: left deltoid   Site: Teddy Michael MA     Calcium supplement daily teaching  Condoms for 2 weeks following first injection dose.

## 2024-10-08 ENCOUNTER — CLINICAL SUPPORT (OUTPATIENT)
Dept: OBGYN CLINIC | Facility: CLINIC | Age: 34
End: 2024-10-08

## 2024-10-08 ENCOUNTER — TELEPHONE (OUTPATIENT)
Dept: OBGYN CLINIC | Facility: CLINIC | Age: 34
End: 2024-10-08

## 2024-10-08 DIAGNOSIS — Z30.42 ENCOUNTER FOR SURVEILLANCE OF INJECTABLE CONTRACEPTIVE: ICD-10-CM

## 2024-10-08 DIAGNOSIS — Z30.42 ENCOUNTER FOR MANAGEMENT AND INJECTION OF DEPO-PROVERA: Primary | ICD-10-CM

## 2024-10-08 PROCEDURE — 96372 THER/PROPH/DIAG INJ SC/IM: CPT

## 2024-10-08 RX ORDER — MEDROXYPROGESTERONE ACETATE 150 MG/ML
150 INJECTION, SUSPENSION INTRAMUSCULAR
Qty: 1 ML | Refills: 0 | Status: SHIPPED | OUTPATIENT
Start: 2024-10-08

## 2024-10-08 RX ADMIN — MEDROXYPROGESTERONE ACETATE 150 MG: 150 INJECTION, SUSPENSION INTRAMUSCULAR at 13:08

## 2024-10-08 NOTE — PROGRESS NOTES
Depo-Provera     [x]   Patient provided box es   0 Refills remain  Refills submitted no  Last  Annual Date / Birth control check : 11/28/2023  Last Depo date: 7/16/24  Side effects: no  HCG: no  if applicable:   Given by: Teddy Michael MA   Site: left deltoid     Calcium supplement daily teaching  Condoms for 2 weeks following first injection dose.

## 2024-10-29 ENCOUNTER — APPOINTMENT (EMERGENCY)
Dept: RADIOLOGY | Facility: HOSPITAL | Age: 34
End: 2024-10-29

## 2024-10-29 ENCOUNTER — HOSPITAL ENCOUNTER (EMERGENCY)
Facility: HOSPITAL | Age: 34
Discharge: HOME | End: 2024-10-29
Attending: INTERNAL MEDICINE

## 2024-10-29 VITALS
TEMPERATURE: 98.7 F | BODY MASS INDEX: 28.34 KG/M2 | SYSTOLIC BLOOD PRESSURE: 110 MMHG | DIASTOLIC BLOOD PRESSURE: 75 MMHG | OXYGEN SATURATION: 97 % | RESPIRATION RATE: 18 BRPM | HEART RATE: 99 BPM | WEIGHT: 160 LBS

## 2024-10-29 DIAGNOSIS — S82.62XA CLOSED DISPLACED FRACTURE OF LATERAL MALLEOLUS OF LEFT FIBULA, INITIAL ENCOUNTER: Primary | ICD-10-CM

## 2024-10-29 PROCEDURE — 73610 X-RAY EXAM OF ANKLE: CPT

## 2024-10-29 PROCEDURE — 99283 EMERGENCY DEPT VISIT LOW MDM: CPT

## 2024-10-29 PROCEDURE — 99284 EMERGENCY DEPT VISIT MOD MDM: CPT | Performed by: INTERNAL MEDICINE

## 2024-10-29 PROCEDURE — 73630 X-RAY EXAM OF FOOT: CPT

## 2024-10-29 RX ORDER — IBUPROFEN 600 MG/1
600 TABLET, FILM COATED ORAL ONCE
Status: COMPLETED | OUTPATIENT
Start: 2024-10-29 | End: 2024-10-29

## 2024-10-29 RX ORDER — IBUPROFEN 600 MG/1
600 TABLET, FILM COATED ORAL EVERY 6 HOURS PRN
Qty: 30 TABLET | Refills: 0 | Status: SHIPPED | OUTPATIENT
Start: 2024-10-29

## 2024-10-29 RX ADMIN — IBUPROFEN 600 MG: 600 TABLET, FILM COATED ORAL at 21:42

## 2024-10-30 NOTE — DISCHARGE INSTRUCTIONS
Follow up with orthopedics dr Cameron   Take medications as prescribed  XR ankle 3+ views LEFT   ED Interpretation   XR L Ankle; chip fracture of the lateral malleolus. No dislocation.       XR foot 3+ views LEFT   ED Interpretation   XR L foot no fracture

## 2024-10-30 NOTE — ED PROVIDER NOTES
Time reflects when diagnosis was documented in both MDM as applicable and the Disposition within this note       Time User Action Codes Description Comment    10/29/2024 10:39 PM Leobardo Gutierrez Add [S82.409A] Fibula fracture     10/29/2024 10:39 PM AbdLeobardo lawrence Remove [S82.409A] Fibula fracture     10/29/2024 10:40 PM AbdLeobardo lawrence Add [S82.62XA] Closed displaced fracture of lateral malleolus of left fibula, initial encounter     10/29/2024 10:42 PM Leobardo Gutierrez Add [O80]  (spontaneous vaginal delivery)           ED Disposition       ED Disposition   Discharge    Condition   Stable    Date/Time   Tue Oct 29, 2024 10:39 PM    Comment   Stacy Esparza discharge to home/self care.                   Assessment & Plan       Medical Decision Making  This is 34 years old came for having pain and swelling of the left ankle.  Patient was walking and going down in the street and she twisted her left ankle, sustained pain and swelling.  This incident happened this morning.  Patient did not take any medication for it and walking is painful.  On the exam; Examination of the left ankle; there is pain and swelling and tenderness at the left malleolus.  Patient able to move her toes.  Patient unable to dorsiflex her left foot.  There is small area of ecchymosis at the left malleolus.  Sensation of the ankle and and left foot dorsum is intact.  XR L Ankle; chip fracture of the lateral malleolus. No dislocation.   XR  L foot, no fracture.   A Stirrup splint applied to the L ankle and pt given crutches.  Pt instructed to follow up with orthopedics. Pt given referral to orhtopedics, and discharged on NSAID for pain and to elevate her ankle when lying at bed.       Amount and/or Complexity of Data Reviewed  Radiology: ordered and independent interpretation performed.     Details: XR L Ankle; chip fracture of the lateral malleolus. No dislocation.   XR  L foot, no fracture.         Risk  Prescription  drug management.             Medications   ibuprofen (MOTRIN) tablet 600 mg (600 mg Oral Given 10/29/24 2142)       ED Risk Strat Scores                                               History of Present Illness       Chief Complaint   Patient presents with    Ankle Injury     L ankle injury that occurred around 8 am this morning when pt stepped wrong while walking down a small incline and rolled ankle outwards       Past Medical History:   Diagnosis Date    Abnormal glucose affecting pregnancy 4/12/2022    Allergic     GBS bacteriuria 2/7/2022    Treat in labor    Varicella       Past Surgical History:   Procedure Laterality Date    NO PAST SURGERIES      TONSILLECTOMY        Family History   Problem Relation Age of Onset    No Known Problems Mother     No Known Problems Father     No Known Problems Brother     No Known Problems Brother     No Known Problems Daughter     No Known Problems Son     Diabetes Maternal Uncle     Breast cancer Neg Hx     Colon cancer Neg Hx     Ovarian cancer Neg Hx       Social History     Tobacco Use    Smoking status: Never    Smokeless tobacco: Never   Vaping Use    Vaping status: Never Used   Substance Use Topics    Alcohol use: Never    Drug use: Never      E-Cigarette/Vaping    E-Cigarette Use Never User       E-Cigarette/Vaping Substances    Nicotine No     THC No     CBD No     Flavoring No     Other No     Unknown No       I have reviewed and agree with the history as documented.     This is 34 years old came for having left ankle pain.  Patient stated that she was walking downhill and she twisted her left ankle.  Patient sustained the pain and swelling at the lateral aspect of the ankle and walking is painful.  Patient denies any fall.  Patient was able to come to the ER walking but it is painful.  Patient did not take any medication for the pain at home.  Patient stated that since incident happened this morning.      History provided by:  Patient   used: No     Ankle Injury  Location:  L ankle  Severity:  Moderate  Onset quality:  Sudden  Timing:  Constant  Progression:  Worsening  Associated symptoms: no abdominal pain, no chest pain, no cough, no ear pain, no fever, no rash, no shortness of breath, no sore throat and no vomiting        Review of Systems   Constitutional:  Negative for chills and fever.   HENT:  Negative for ear pain and sore throat.    Eyes:  Negative for pain and visual disturbance.   Respiratory:  Negative for cough and shortness of breath.    Cardiovascular:  Negative for chest pain and palpitations.   Gastrointestinal:  Negative for abdominal pain and vomiting.   Genitourinary:  Negative for dysuria and hematuria.   Musculoskeletal:  Positive for arthralgias, gait problem and joint swelling. Negative for back pain, neck pain and neck stiffness.   Skin:  Negative for color change and rash.   Neurological:  Negative for seizures and syncope.   All other systems reviewed and are negative.          Objective       ED Triage Vitals   Temperature Pulse Blood Pressure Respirations SpO2 Patient Position - Orthostatic VS   10/29/24 2125 10/29/24 2120 10/29/24 2120 10/29/24 2120 10/29/24 2120 10/29/24 2120   98.7 °F (37.1 °C) 99 110/75 18 97 % Sitting      Temp Source Heart Rate Source BP Location FiO2 (%) Pain Score    10/29/24 2125 10/29/24 2120 10/29/24 2120 -- 10/29/24 2109    Oral Monitor Right arm  6      Vitals      Date and Time Temp Pulse SpO2 Resp BP Pain Score FACES Pain Rating User   10/29/24 2142 -- -- -- -- -- 10 - Worst Possible Pain -- KLB   10/29/24 2125 98.7 °F (37.1 °C) -- -- -- -- -- -- KLB   10/29/24 2120 -- 99 97 % 18 110/75 10 - Worst Possible Pain -- KLB   10/29/24 2109 -- -- -- -- -- 6 -- DELFINA            Physical Exam  Vitals and nursing note reviewed.   Constitutional:       General: She is not in acute distress.     Appearance: She is well-developed. She is not ill-appearing, toxic-appearing or diaphoretic.   HENT:      Head:  Normocephalic and atraumatic.      Right Ear: Ear canal normal.      Left Ear: Ear canal normal.      Nose: Nose normal.      Mouth/Throat:      Mouth: Mucous membranes are moist.   Eyes:      Conjunctiva/sclera: Conjunctivae normal.   Cardiovascular:      Rate and Rhythm: Normal rate and regular rhythm.      Heart sounds: No murmur heard.     No friction rub. No gallop.   Pulmonary:      Effort: Pulmonary effort is normal. No respiratory distress.      Breath sounds: Normal breath sounds.   Abdominal:      Palpations: Abdomen is soft.      Tenderness: There is no abdominal tenderness.   Musculoskeletal:         General: Swelling, tenderness and signs of injury present. No deformity.      Cervical back: Normal range of motion and neck supple.      Right lower leg: No edema.      Left lower leg: No edema.      Comments: Examination of the left ankle; there is pain and swelling and tenderness at the left malleolus.  Patient able to move her toes.  Patient unable to dorsiflex her left foot.  There is small area of ecchymosis at the left malleolus.  Sensation of the ankle and and left foot dorsum is intact.   Skin:     General: Skin is warm and dry.      Capillary Refill: Capillary refill takes less than 2 seconds.   Neurological:      Mental Status: She is alert.   Psychiatric:         Mood and Affect: Mood normal.         Results Reviewed       None            XR ankle 3+ views LEFT   ED Interpretation by Leobardo Gutierrez MD (10/29 2232)   XR L Ankle; chip fracture of the lateral malleolus. No dislocation.       XR foot 3+ views LEFT   ED Interpretation by Leobardo Gutierrez MD (10/29 2230)   XR L foot no fracture          Orthopedic injury treatment    Date/Time: 10/29/2024 10:36 PM    Performed by: Leobardo Gutierrez MD  Authorized by: Leobardo Gutierrez MD    Patient Location:  Bedside  [place] Protocol:  Procedure performed by:  Consent: Verbal consent obtained.  Consent given by: patient  Patient  understanding: patient states understanding of the procedure being performed  Patient consent: the patient's understanding of the procedure matches consent given  Procedure consent: procedure consent matches procedure scheduled  Relevant documents: relevant documents present and verified  Test results: test results available and properly labeled  Site marked: the operative site was marked  Radiology Images displayed and confirmed. If images not available, report reviewed: imaging studies available  Patient identity confirmed: verbally with patient, arm band, provided demographic data, hospital-assigned identification number and anonymous protocol, patient vented/unresponsive    Injury location:  Ankle  Location details:  Left ankle  Injury type:  Fracture  Fracture type: lateral malleolus    Fracture type: lateral malleolus    Distal perfusion: normal    Neurological function: normal    Range of motion: reduced    Local anesthesia used?: No    General anesthesia used?: No    Manipulation performed?: No    Immobilization:  Splint  Splint type: ankle aircast.  Distal perfusion: normal    Neurological function: normal    Range of motion: normal    Patient tolerance:  Patient tolerated the procedure well with no immediate complications      ED Medication and Procedure Management   Prior to Admission Medications   Prescriptions Last Dose Informant Patient Reported? Taking?   Prenatal Vit-Fe Fumarate-FA (PRENATAL PO)  Self Yes No   Sig: Take by mouth   Patient not taking: Reported on 11/7/2023   acetaminophen (TYLENOL) 325 mg tablet  Self No No   Sig: Take 2 tablets (650 mg total) by mouth every 4 (four) hours as needed for mild pain   benzonatate (TESSALON) 200 MG capsule   No No   Sig: Take 1 capsule (200 mg total) by mouth 3 (three) times a day as needed for cough   clotrimazole-betamethasone (LOTRISONE) 1-0.05 % cream   No No   Sig: Apply topically 2 (two) times a day for 14 days Use sparingly   Patient not taking:  Reported on 5/30/2024   docusate sodium (COLACE) 100 mg capsule  Self No No   Sig: Take 1 capsule (100 mg total) by mouth 2 (two) times a day   Patient not taking: Reported on 4/15/2022   fluconazole (DIFLUCAN) 150 mg tablet  Self Yes No   Sig: Take 150 mg by mouth once   Patient not taking: Reported on 1/11/2024   fluticasone (FLONASE) 50 mcg/act nasal spray   No No   Sig: ADMINISTER 2 SPRAYS IN EACH NOSTRIL PER DAY X 1 WEEK THEN ADMINISTER 1 SPRAY IN EACH NOSTRIL DAILY   ibuprofen (MOTRIN) 600 mg tablet  Self No No   Sig: Take 1 tablet (600 mg total) by mouth every 6 (six) hours as needed for moderate pain   Patient not taking: Reported on 7/14/2022   ibuprofen (MOTRIN) 600 mg tablet   No Yes   Sig: Take 1 tablet (600 mg total) by mouth every 6 (six) hours as needed for moderate pain   medroxyPROGESTERone (DEPO-PROVERA) 150 mg/mL injection   No No   Sig: Inject 1 mL (150 mg total) into a muscle every 3 (three) months      Facility-Administered Medications Last Administration Doses Remaining   medroxyPROGESTERone (DEPO-PROVERA) IM injection 150 mg 10/8/2024  1:08 PM         Discharge Medication List as of 10/29/2024 10:44 PM        CONTINUE these medications which have CHANGED    Details   ibuprofen (MOTRIN) 600 mg tablet Take 1 tablet (600 mg total) by mouth every 6 (six) hours as needed for moderate pain, Starting Tue 10/29/2024, Normal           CONTINUE these medications which have NOT CHANGED    Details   acetaminophen (TYLENOL) 325 mg tablet Take 2 tablets (650 mg total) by mouth every 4 (four) hours as needed for mild pain, Starting Sun 6/5/2022, No Print      benzonatate (TESSALON) 200 MG capsule Take 1 capsule (200 mg total) by mouth 3 (three) times a day as needed for cough, Starting Thu 5/30/2024, Normal      clotrimazole-betamethasone (LOTRISONE) 1-0.05 % cream Apply topically 2 (two) times a day for 14 days Use sparingly, Starting Thu 7/28/2022, Until Thu 8/11/2022, Normal      docusate sodium (COLACE)  100 mg capsule Take 1 capsule (100 mg total) by mouth 2 (two) times a day, Starting u 2022, Normal      fluconazole (DIFLUCAN) 150 mg tablet Take 150 mg by mouth once, Starting 2023, Historical Med      fluticasone (FLONASE) 50 mcg/act nasal spray ADMINISTER 2 SPRAYS IN EACH NOSTRIL PER DAY X 1 WEEK THEN ADMINISTER 1 SPRAY IN EACH NOSTRIL DAILY, Normal      medroxyPROGESTERone (DEPO-PROVERA) 150 mg/mL injection Inject 1 mL (150 mg total) into a muscle every 3 (three) months, Starting Tue 10/8/2024, Normal      Prenatal Vit-Fe Fumarate-FA (PRENATAL PO) Take by mouth, Historical Med             ED SEPSIS DOCUMENTATION   Time reflects when diagnosis was documented in both MDM as applicable and the Disposition within this note       Time User Action Codes Description Comment    10/29/2024 10:39 PM Leobardo Gutierrez Add [S82.409A] Fibula fracture     10/29/2024 10:39 PM Leobardo Gutierrez Remove [S82.409A] Fibula fracture     10/29/2024 10:40 PM Leobardo Gutierrez Add [S82.62XA] Closed displaced fracture of lateral malleolus of left fibula, initial encounter     10/29/2024 10:42 PM Leobardo Gutierrez Add [O80]  (spontaneous vaginal delivery)                  Leobardo Gutierrez MD  10/29/24 3744

## 2024-10-31 NOTE — PROGRESS NOTES
OB/GYN prenatal visit    S: 36 y o  Sonia Abdul 38w3d here for PN visit  She has discharge and pelvic pressure decline contractions, vaginal bleeding, loss of fluid, or decreased fetal movement  O:  Vitals:    22 1050   BP: 107/72   Pulse: 98       Gen: no acute distress, nonlabored breathing     Fetal Heart Rate: 142    A/P:  38 weeks gestation of pregnancy  - Reporting pelvic and discharge  Speculum exam: No fluid leakage with valsalva, Moderate amount of discharge, Dry prep negative for ferning, KOH nega and Wet prep negative  SVE 2 /-2, vertex  Labor precautions discussed     - Immunization: TDAP, COVID 19 x2 and Flu vaccines were given  - Contraception: Progestins; Depo-Nexplanon or IUD not decided yet    GBS bacteriuria  - Will need treatment in labor      IUP at 38w3d  No obstetric complaints today  TWG: + not charted (recommended weight gain 12-40 lbs)  Flu vaccine yes, TDAP vaccine yes, COVID x2 yes  Contraception: depo provera  Breastfeeding: yes  Birth plan: yes about epidural  Discussed  labor precautions and fetal kick counts    Return to clinic in 1 week    COVID 19 precautions were discussed with patient at length, reviewed symptoms, hygiene, social distancing, patient to call office  with questions/concerns    36 weeks: collect GBS/PCN allergy, check fetal position, contraception and birth plan, Lopez Silver MD  2092  89:34 AM
Other

## 2024-11-04 ENCOUNTER — OFFICE VISIT (OUTPATIENT)
Dept: OBGYN CLINIC | Facility: CLINIC | Age: 34
End: 2024-11-04

## 2024-11-04 VITALS — BODY MASS INDEX: 28.34 KG/M2 | HEIGHT: 63 IN

## 2024-11-04 DIAGNOSIS — S93.492A SPRAIN OF ANTERIOR TALOFIBULAR LIGAMENT OF LEFT ANKLE, INITIAL ENCOUNTER: Primary | ICD-10-CM

## 2024-11-04 DIAGNOSIS — S82.62XA CLOSED DISPLACED FRACTURE OF LATERAL MALLEOLUS OF LEFT FIBULA, INITIAL ENCOUNTER: ICD-10-CM

## 2024-11-04 PROCEDURE — 99203 OFFICE O/P NEW LOW 30 MIN: CPT | Performed by: STUDENT IN AN ORGANIZED HEALTH CARE EDUCATION/TRAINING PROGRAM

## 2024-11-04 RX ORDER — MELOXICAM 15 MG/1
15 TABLET ORAL DAILY
Qty: 30 TABLET | Refills: 2 | Status: SHIPPED | OUTPATIENT
Start: 2024-11-04 | End: 2025-02-02

## 2024-11-04 NOTE — PROGRESS NOTES
Ortho Sports Medicine New Patient Ankle Visit    Assesment:  Stacy Esparza is a 34 y.o. female who presents with left ankle sprain     Plan:     utilized throughout duration of visit to provide translation with discussion of history and treatments.     We do long discussion regarding the diagnosis and treatment plan. Imaging and findings discussed at length with patient in the office today. I recommended starting a course of conservative treatment.  I recommended at home physical therapy to focus on decreasing swelling, normalizing motion, and progressing to strengthening proprioception.  We reviewed how to perform home exercises including dorsiflexion and plantarflexion as well as ABCs.    Discussed continuing air cast to the left ankle for 2-3 weeks at times of increased activity to provide additional support to the left ankle. Discussed at 2-3 weeks can wean out of the aircast and into supportive shoes. Can continue weight-bearing as tolerated and I recommended weaning out of the Aircast as soon as possible.    Recommended ice and activity modification as needed for pain. Discussed Meloxicam for persistent pain and inflammation. Discussed side effects and risks of medication. Prescription and directions for oral use provided to the patient today. Patient expressed understanding and agreeable to the above treatments. Will follow up in 4 weeks for re-evaluation of current issue.     Follow-up Return in about 4 weeks (around 12/2/2024).     Chief Complaint   Patient presents with    Left Ankle - Pain       History of Present Illness:  The patient is a 34 y.o. female who presents for evaluation of the left ankle.  Patient is [place]-speaking.   was used throughout duration of visit to provide history.  Patient states on 10/29 she suffered an inversion injury to the left ankle.  Following this time, patient had lateral left ankle pain increased with activity. Also notes bruising to  the lateral aspect of the left ankle. Patient went to ED on 10/29 and was found to have a fracture of the left distal fibula. She was placed in an aircast and given crutches with Orthopedic follow up. Patient states she has been ambulating on the left ankle without significant increase in pain. Has also been utilizing aircast and activity modification without significant increase in pain. Denies previous surgery or injury. Denies previous injections.       Ankle Surgical History:  None      Past Medical, Social and Family History:  Past Medical History:   Diagnosis Date    Abnormal glucose affecting pregnancy 4/12/2022    Allergic     GBS bacteriuria 2/7/2022    Treat in labor    Varicella      Past Surgical History:   Procedure Laterality Date    NO PAST SURGERIES      TONSILLECTOMY       No Known Allergies  Current Outpatient Medications on File Prior to Visit   Medication Sig Dispense Refill    acetaminophen (TYLENOL) 325 mg tablet Take 2 tablets (650 mg total) by mouth every 4 (four) hours as needed for mild pain  0    benzonatate (TESSALON) 200 MG capsule Take 1 capsule (200 mg total) by mouth 3 (three) times a day as needed for cough 20 capsule 0    fluticasone (FLONASE) 50 mcg/act nasal spray ADMINISTER 2 SPRAYS IN EACH NOSTRIL PER DAY X 1 WEEK THEN ADMINISTER 1 SPRAY IN EACH NOSTRIL DAILY 48 mL 1    ibuprofen (MOTRIN) 600 mg tablet Take 1 tablet (600 mg total) by mouth every 6 (six) hours as needed for moderate pain 30 tablet 0    medroxyPROGESTERone (DEPO-PROVERA) 150 mg/mL injection Inject 1 mL (150 mg total) into a muscle every 3 (three) months 1 mL 0    clotrimazole-betamethasone (LOTRISONE) 1-0.05 % cream Apply topically 2 (two) times a day for 14 days Use sparingly (Patient not taking: Reported on 5/30/2024) 30 g 0    docusate sodium (COLACE) 100 mg capsule Take 1 capsule (100 mg total) by mouth 2 (two) times a day (Patient not taking: Reported on 4/15/2022) 60 capsule 6    fluconazole (DIFLUCAN) 150  mg tablet Take 150 mg by mouth once (Patient not taking: Reported on 1/11/2024)      Prenatal Vit-Fe Fumarate-FA (PRENATAL PO) Take by mouth (Patient not taking: Reported on 11/7/2023)       Current Facility-Administered Medications on File Prior to Visit   Medication Dose Route Frequency Provider Last Rate Last Admin    medroxyPROGESTERone (DEPO-PROVERA) IM injection 150 mg  150 mg Intramuscular Q3 Months Barbie Quintero MD   150 mg at 10/08/24 1308     Social History     Socioeconomic History    Marital status: /Civil Union     Spouse name: Not on file    Number of children: Not on file    Years of education: Not on file    Highest education level: Not on file   Occupational History    Not on file   Tobacco Use    Smoking status: Never    Smokeless tobacco: Never   Vaping Use    Vaping status: Never Used   Substance and Sexual Activity    Alcohol use: Never    Drug use: Never    Sexual activity: Yes     Partners: Male     Birth control/protection: None, Injection   Other Topics Concern    Not on file   Social History Narrative    Not on file     Social Determinants of Health     Financial Resource Strain: Low Risk  (11/7/2023)    Overall Financial Resource Strain (CARDIA)     Difficulty of Paying Living Expenses: Not hard at all   Food Insecurity: No Food Insecurity (11/7/2023)    Nursing - Inadequate Food Risk Classification     Worried About Running Out of Food in the Last Year: Never true     Ran Out of Food in the Last Year: Never true     Ran Out of Food in the Last Year: Not on file   Transportation Needs: No Transportation Needs (11/7/2023)    PRAPARE - Transportation     Lack of Transportation (Medical): No     Lack of Transportation (Non-Medical): No   Physical Activity: Insufficiently Active (1/18/2022)    Exercise Vital Sign     Days of Exercise per Week: 3 days     Minutes of Exercise per Session: 30 min   Stress: No Stress Concern Present (1/11/2022)    [employer] [employer] - Occupational Stress Questionnaire     Feeling of Stress : Only a little   Social Connections: Moderately Integrated (1/18/2022)    Social Connection and Isolation Panel [NHANES]     Frequency of Communication with Friends and Family: Once a week     Frequency of Social Gatherings with Friends and Family: Twice a week     Attends Congregation Services: More than 4 times per year     Active Member of Clubs or Organizations: No     Attends Club or Organization Meetings: More than 4 times per year     Marital Status: Never    Intimate Partner Violence: Not At Risk (1/11/2022)    Humiliation, Afraid, Rape, and Kick questionnaire     Fear of Current or Ex-Partner: No     Emotionally Abused: No     Physically Abused: No     Sexually Abused: No   Housing Stability: Low Risk  (11/7/2023)    Housing Stability Vital Sign     Unable to Pay for Housing in the Last Year: No     Number of Times Moved in the Last Year: 1     Homeless in the Last Year: No         I have reviewed the past medical, surgical, social and family history, medications and allergies as documented in the EMR.    Review of systems: ROS is negative other than that noted in the HPI.  Constitutional: Negative for fatigue and fever.   HENT: Negative for sore throat.    Respiratory: Negative for shortness of breath.    Cardiovascular: Negative for chest pain.   Gastrointestinal: Negative for abdominal pain.   Endocrine: Negative for cold intolerance and heat intolerance.   Genitourinary: Negative for flank pain.   Musculoskeletal: Negative for back pain.   Skin: Negative for rash.   Allergic/Immunologic: Negative for immunocompromised state.   Neurological: Negative for dizziness.   Psychiatric/Behavioral: Negative for agitation.      Physical Exam:    There were no vitals filed for this visit.    General/Constitutional: NAD, well developed, well nourished  HENT: Normocephalic, atraumatic  CV: Intact distal pulses, regular rate  Resp: No respiratory  distress or labored breathing  Lymphatic: No lymphadenopathy palpated  Neuro: Alert and Oriented x 3, no focal deficits  Psych: Normal mood, normal affect, normal judgement, normal behavior  Skin: Warm, dry, no rashes, no erythema       Ankle Examination (focused):     Skin intact. Bruising over the lateral ankle   No Wounds  Swelling: Swelling over the lateral ankle   ROM:    Dorsiflexion: Intact with pain   Plantarflexion: Intact with pain   Inversion/eversion: Intact with pain   Positive anterior drawer  Negative squeeze test   Negative circumduction     Gait: Antalgic favoring the right side     No subluxation of the peroneal tendons or tenderness to palpation along the peroneal tendons    Tender to palpation over lateral malleolus and ATFL  Grade 1 anterior drawer     No pain with palpation or range of motion of midfoot and forefoot bilaterally    No calf tenderness to palpation bilaterally    LE NV Exam: +2 DP/PT pulses bilaterally  Sensation intact to light touch L2-S1 bilaterally      Ankle Imaging:    I personally reviewed and interpreted 3 radiographs of the left foot/ankle which were obtained 10/29/2024 and reviewed in detail with the patient with the patient in office today.  No significant degenerative changes.  Mildy displaced avulsion fracture of the distal fibula.  I have reviewed the radiology report and agree with their impression.

## 2024-11-04 NOTE — PATIENT INSTRUCTIONS
Take meloxicam 15 mg daily with food or water as needed for pain / inflammation  Take tylenol 1000 mg every 8 hours as needed for pain